# Patient Record
Sex: MALE | Race: WHITE | Employment: OTHER | URBAN - METROPOLITAN AREA
[De-identification: names, ages, dates, MRNs, and addresses within clinical notes are randomized per-mention and may not be internally consistent; named-entity substitution may affect disease eponyms.]

---

## 2017-01-01 ENCOUNTER — HOSPITAL ENCOUNTER (OUTPATIENT)
Dept: OTHER | Age: 82
Discharge: HOME | End: 2017-01-31
Attending: INTERNAL MEDICINE | Admitting: INTERNAL MEDICINE

## 2017-01-17 LAB
ANION GAP SERPL CALCULATED.3IONS-SCNC: 14 MMOL/L (ref 4–16)
BUN BLDV-MCNC: 31 MG/DL (ref 6–23)
CALCIUM SERPL-MCNC: 9.1 MG/DL (ref 8.3–10.6)
CHLORIDE BLD-SCNC: 99 MMOL/L (ref 99–110)
CO2: 25 MMOL/L (ref 21–32)
CREAT SERPL-MCNC: 1.5 MG/DL (ref 0.9–1.3)
GFR AFRICAN AMERICAN: 53 ML/MIN/1.73M2
GFR NON-AFRICAN AMERICAN: 44 ML/MIN/1.73M2
GLUCOSE BLD-MCNC: 72 MG/DL (ref 70–140)
POTASSIUM SERPL-SCNC: 4.6 MMOL/L (ref 3.5–5.1)
SODIUM BLD-SCNC: 138 MMOL/L (ref 135–145)

## 2017-02-01 ENCOUNTER — HOSPITAL ENCOUNTER (OUTPATIENT)
Dept: OTHER | Age: 82
Discharge: HOME | End: 2017-02-28
Attending: INTERNAL MEDICINE | Admitting: INTERNAL MEDICINE

## 2017-02-16 LAB
ANION GAP SERPL CALCULATED.3IONS-SCNC: 11 MMOL/L (ref 4–16)
BUN BLDV-MCNC: 34 MG/DL (ref 6–23)
CALCIUM SERPL-MCNC: 8.8 MG/DL (ref 8.3–10.6)
CHLORIDE BLD-SCNC: 99 MMOL/L (ref 99–110)
CO2: 27 MMOL/L (ref 21–32)
CREAT SERPL-MCNC: 1.4 MG/DL (ref 0.9–1.3)
GFR AFRICAN AMERICAN: 58 ML/MIN/1.73M2
GFR NON-AFRICAN AMERICAN: 48 ML/MIN/1.73M2
GLUCOSE BLD-MCNC: 71 MG/DL (ref 70–140)
POTASSIUM SERPL-SCNC: 4.8 MMOL/L (ref 3.5–5.1)
SODIUM BLD-SCNC: 137 MMOL/L (ref 135–145)
URIC ACID: 7.5 MG/DL (ref 3.5–7.2)

## 2017-02-22 LAB
OVA AND PARASITE WET MOUNT: NEGATIVE
TRICHROME SMEAR, STOOL O&P: NORMAL

## 2017-03-01 ENCOUNTER — HOSPITAL ENCOUNTER (OUTPATIENT)
Dept: OTHER | Age: 82
Discharge: HOME | End: 2017-03-31
Attending: INTERNAL MEDICINE | Admitting: INTERNAL MEDICINE

## 2017-03-16 LAB
ANION GAP SERPL CALCULATED.3IONS-SCNC: 13 MMOL/L (ref 4–16)
BUN BLDV-MCNC: 30 MG/DL (ref 6–23)
CALCIUM SERPL-MCNC: 8.9 MG/DL (ref 8.3–10.6)
CHLORIDE BLD-SCNC: 100 MMOL/L (ref 99–110)
CO2: 25 MMOL/L (ref 21–32)
CREAT SERPL-MCNC: 1.4 MG/DL (ref 0.9–1.3)
GFR AFRICAN AMERICAN: 58 ML/MIN/1.73M2
GFR NON-AFRICAN AMERICAN: 48 ML/MIN/1.73M2
GLUCOSE BLD-MCNC: 75 MG/DL (ref 70–140)
POTASSIUM SERPL-SCNC: 4.7 MMOL/L (ref 3.5–5.1)
SODIUM BLD-SCNC: 138 MMOL/L (ref 135–145)

## 2017-03-23 LAB
HCT VFR BLD CALC: 40.5 % (ref 42–52)
HEMOGLOBIN: 12.7 GM/DL (ref 13.5–18)
MCH RBC QN AUTO: 27.9 PG (ref 27–31)
MCHC RBC AUTO-ENTMCNC: 31.4 % (ref 32–36)
MCV RBC AUTO: 89 FL (ref 78–100)
PDW BLD-RTO: 15.2 % (ref 11.7–14.9)
PLATELET # BLD: 168 K/CU MM (ref 140–440)
PMV BLD AUTO: 14.1 FL (ref 7.5–11.1)
RBC # BLD: 4.55 M/CU MM (ref 4.6–6.2)
WBC # BLD: 6.9 K/CU MM (ref 4–10.5)

## 2017-03-24 LAB
CULTURE: NORMAL
REPORT STATUS: NORMAL
REQUEST PROBLEM: NORMAL
SPECIMEN: NORMAL

## 2017-04-01 ENCOUNTER — HOSPITAL ENCOUNTER (OUTPATIENT)
Dept: OTHER | Age: 82
Discharge: HOME | End: 2017-04-30
Attending: INTERNAL MEDICINE | Admitting: INTERNAL MEDICINE

## 2017-04-14 LAB — HEMOCCULT SP1 STL QL: NEGATIVE

## 2017-04-17 LAB
OVA AND PARASITE WET MOUNT: NEGATIVE
TRICHROME SMEAR, STOOL O&P: NEGATIVE

## 2017-04-18 LAB
ANION GAP SERPL CALCULATED.3IONS-SCNC: 15 MMOL/L (ref 4–16)
BUN BLDV-MCNC: 41 MG/DL (ref 6–23)
CALCIUM SERPL-MCNC: 8.8 MG/DL (ref 8.3–10.6)
CHLORIDE BLD-SCNC: 101 MMOL/L (ref 99–110)
CO2: 26 MMOL/L (ref 21–32)
CREAT SERPL-MCNC: 1.6 MG/DL (ref 0.9–1.3)
GFR AFRICAN AMERICAN: 50 ML/MIN/1.73M2
GFR NON-AFRICAN AMERICAN: 41 ML/MIN/1.73M2
GLUCOSE BLD-MCNC: 64 MG/DL (ref 70–140)
POTASSIUM SERPL-SCNC: 4.8 MMOL/L (ref 3.5–5.1)
SODIUM BLD-SCNC: 142 MMOL/L (ref 135–145)

## 2017-05-01 ENCOUNTER — HOSPITAL ENCOUNTER (OUTPATIENT)
Dept: OTHER | Age: 82
Discharge: HOME | End: 2017-05-31
Attending: INTERNAL MEDICINE | Admitting: INTERNAL MEDICINE

## 2017-05-16 LAB
ANION GAP SERPL CALCULATED.3IONS-SCNC: 14 MMOL/L (ref 4–16)
BUN BLDV-MCNC: 27 MG/DL (ref 6–23)
CALCIUM SERPL-MCNC: 9 MG/DL (ref 8.3–10.6)
CHLORIDE BLD-SCNC: 100 MMOL/L (ref 99–110)
CO2: 24 MMOL/L (ref 21–32)
CREAT SERPL-MCNC: 1.4 MG/DL (ref 0.9–1.3)
GFR AFRICAN AMERICAN: 58 ML/MIN/1.73M2
GFR NON-AFRICAN AMERICAN: 48 ML/MIN/1.73M2
GLUCOSE BLD-MCNC: 67 MG/DL (ref 70–140)
HCT VFR BLD CALC: 39.9 % (ref 42–52)
HEMOGLOBIN: 12.2 GM/DL (ref 13.5–18)
MCH RBC QN AUTO: 27.2 PG (ref 27–31)
MCHC RBC AUTO-ENTMCNC: 30.6 % (ref 32–36)
MCV RBC AUTO: 89.1 FL (ref 78–100)
PDW BLD-RTO: 15.1 % (ref 11.7–14.9)
PLATELET # BLD: 161 K/CU MM (ref 140–440)
PMV BLD AUTO: 13.9 FL (ref 7.5–11.1)
POTASSIUM SERPL-SCNC: 4.2 MMOL/L (ref 3.5–5.1)
RBC # BLD: 4.48 M/CU MM (ref 4.6–6.2)
SODIUM BLD-SCNC: 138 MMOL/L (ref 135–145)
WBC # BLD: 6.1 K/CU MM (ref 4–10.5)

## 2017-06-01 ENCOUNTER — HOSPITAL ENCOUNTER (OUTPATIENT)
Dept: OTHER | Age: 82
Discharge: HOME | End: 2017-06-30
Attending: INTERNAL MEDICINE | Admitting: INTERNAL MEDICINE

## 2017-06-07 LAB
ALBUMIN SERPL-MCNC: 2.9 GM/DL (ref 3.4–5)
ALP BLD-CCNC: 55 IU/L (ref 40–128)
ALT SERPL-CCNC: 7 U/L (ref 10–40)
ANION GAP SERPL CALCULATED.3IONS-SCNC: 12 MMOL/L (ref 4–16)
AST SERPL-CCNC: 13 IU/L (ref 15–37)
BILIRUB SERPL-MCNC: 0.3 MG/DL (ref 0–1)
BUN BLDV-MCNC: 31 MG/DL (ref 6–23)
CALCIUM SERPL-MCNC: 8.4 MG/DL (ref 8.3–10.6)
CHLORIDE BLD-SCNC: 102 MMOL/L (ref 99–110)
CO2: 26 MMOL/L (ref 21–32)
CREAT SERPL-MCNC: 1.5 MG/DL (ref 0.9–1.3)
GFR AFRICAN AMERICAN: 53 ML/MIN/1.73M2
GFR NON-AFRICAN AMERICAN: 44 ML/MIN/1.73M2
GLUCOSE BLD-MCNC: 75 MG/DL (ref 70–140)
HCT VFR BLD CALC: 36 % (ref 42–52)
HEMOGLOBIN: 11.4 GM/DL (ref 13.5–18)
MCH RBC QN AUTO: 27.9 PG (ref 27–31)
MCHC RBC AUTO-ENTMCNC: 31.7 % (ref 32–36)
MCV RBC AUTO: 88.2 FL (ref 78–100)
PDW BLD-RTO: 15.7 % (ref 11.7–14.9)
PLATELET # BLD: 166 K/CU MM (ref 140–440)
PMV BLD AUTO: 13.5 FL (ref 7.5–11.1)
POTASSIUM SERPL-SCNC: 4.3 MMOL/L (ref 3.5–5.1)
RBC # BLD: 4.08 M/CU MM (ref 4.6–6.2)
SODIUM BLD-SCNC: 140 MMOL/L (ref 135–145)
TOTAL PROTEIN: 5.7 GM/DL (ref 6.4–8.2)
WBC # BLD: 8.1 K/CU MM (ref 4–10.5)

## 2017-06-08 ENCOUNTER — HOSPITAL ENCOUNTER (OUTPATIENT)
Dept: GENERAL RADIOLOGY | Age: 82
Discharge: HOME | End: 2017-06-08
Attending: INTERNAL MEDICINE | Admitting: INTERNAL MEDICINE

## 2017-06-08 DIAGNOSIS — R13.14 DYSPHAGIA, PHARYNGOESOPHAGEAL: ICD-10-CM

## 2017-07-01 ENCOUNTER — HOSPITAL ENCOUNTER (OUTPATIENT)
Dept: OTHER | Age: 82
Discharge: HOME | End: 2017-07-31
Attending: INTERNAL MEDICINE | Admitting: INTERNAL MEDICINE

## 2017-07-10 LAB
ALBUMIN SERPL-MCNC: 2.9 GM/DL (ref 3.4–5)
ALP BLD-CCNC: 67 IU/L (ref 40–128)
ALT SERPL-CCNC: 9 U/L (ref 10–40)
ANION GAP SERPL CALCULATED.3IONS-SCNC: 11 MMOL/L (ref 4–16)
AST SERPL-CCNC: 13 IU/L (ref 15–37)
BASOPHILS ABSOLUTE: 0 K/CU MM
BASOPHILS RELATIVE PERCENT: 0.5 % (ref 0–1)
BILIRUB SERPL-MCNC: 0.3 MG/DL (ref 0–1)
BUN BLDV-MCNC: 47 MG/DL (ref 6–23)
CALCIUM SERPL-MCNC: 8.7 MG/DL (ref 8.3–10.6)
CHLORIDE BLD-SCNC: 99 MMOL/L (ref 99–110)
CO2: 28 MMOL/L (ref 21–32)
CREAT SERPL-MCNC: 1.5 MG/DL (ref 0.9–1.3)
DIFFERENTIAL TYPE: ABNORMAL
EOSINOPHILS ABSOLUTE: 0.2 K/CU MM
EOSINOPHILS RELATIVE PERCENT: 2.9 % (ref 0–3)
GFR AFRICAN AMERICAN: 53 ML/MIN/1.73M2
GFR NON-AFRICAN AMERICAN: 44 ML/MIN/1.73M2
GLUCOSE BLD-MCNC: 74 MG/DL (ref 70–140)
HCT VFR BLD CALC: 36.4 % (ref 42–52)
HEMOGLOBIN: 11.3 GM/DL (ref 13.5–18)
IMMATURE NEUTROPHIL %: 0.3 % (ref 0–0.43)
LYMPHOCYTES ABSOLUTE: 1.6 K/CU MM
LYMPHOCYTES RELATIVE PERCENT: 24.1 % (ref 24–44)
MCH RBC QN AUTO: 27.7 PG (ref 27–31)
MCHC RBC AUTO-ENTMCNC: 31 % (ref 32–36)
MCV RBC AUTO: 89.2 FL (ref 78–100)
MONOCYTES ABSOLUTE: 0.7 K/CU MM
MONOCYTES RELATIVE PERCENT: 10.6 % (ref 0–4)
NUCLEATED RBC %: 0 %
PDW BLD-RTO: 16.6 % (ref 11.7–14.9)
PLATELET # BLD: 164 K/CU MM (ref 140–440)
PMV BLD AUTO: 13.7 FL (ref 7.5–11.1)
POTASSIUM SERPL-SCNC: 4.6 MMOL/L (ref 3.5–5.1)
RBC # BLD: 4.08 M/CU MM (ref 4.6–6.2)
SEGMENTED NEUTROPHILS ABSOLUTE COUNT: 4.1 K/CU MM
SEGMENTED NEUTROPHILS RELATIVE PERCENT: 61.6 % (ref 36–66)
SODIUM BLD-SCNC: 138 MMOL/L (ref 135–145)
TOTAL IMMATURE NEUTOROPHIL: 0.02 K/CU MM
TOTAL NUCLEATED RBC: 0 K/CU MM
TOTAL PROTEIN: 5.7 GM/DL (ref 6.4–8.2)
TSH HIGH SENSITIVITY: 1.12 UIU/ML (ref 0.27–4.2)
WBC # BLD: 6.6 K/CU MM (ref 4–10.5)

## 2017-07-25 LAB
ANION GAP SERPL CALCULATED.3IONS-SCNC: 13 MMOL/L (ref 4–16)
BUN BLDV-MCNC: 48 MG/DL (ref 6–23)
CALCIUM SERPL-MCNC: 9.1 MG/DL (ref 8.3–10.6)
CHLORIDE BLD-SCNC: 104 MMOL/L (ref 99–110)
CO2: 26 MMOL/L (ref 21–32)
CREAT SERPL-MCNC: 1.6 MG/DL (ref 0.9–1.3)
GFR AFRICAN AMERICAN: 50 ML/MIN/1.73M2
GFR NON-AFRICAN AMERICAN: 41 ML/MIN/1.73M2
GLUCOSE BLD-MCNC: 68 MG/DL (ref 70–140)
POTASSIUM SERPL-SCNC: 4.9 MMOL/L (ref 3.5–5.1)
PREALBUMIN: 22 MG/DL (ref 20–40)
SODIUM BLD-SCNC: 143 MMOL/L (ref 135–145)

## 2017-08-01 ENCOUNTER — HOSPITAL ENCOUNTER (OUTPATIENT)
Dept: OTHER | Age: 82
Discharge: HOME | End: 2017-08-31
Attending: INTERNAL MEDICINE | Admitting: INTERNAL MEDICINE

## 2017-08-09 LAB
ALBUMIN SERPL-MCNC: 3 GM/DL (ref 3.4–5)
ALP BLD-CCNC: 68 IU/L (ref 40–128)
ALT SERPL-CCNC: 9 U/L (ref 10–40)
ANION GAP SERPL CALCULATED.3IONS-SCNC: 10 MMOL/L (ref 4–16)
AST SERPL-CCNC: 14 IU/L (ref 15–37)
BASOPHILS ABSOLUTE: 0 K/CU MM
BASOPHILS RELATIVE PERCENT: 0.5 % (ref 0–1)
BILIRUB SERPL-MCNC: 0.2 MG/DL (ref 0–1)
BUN BLDV-MCNC: 49 MG/DL (ref 6–23)
CALCIUM SERPL-MCNC: 8.7 MG/DL (ref 8.3–10.6)
CHLORIDE BLD-SCNC: 101 MMOL/L (ref 99–110)
CO2: 28 MMOL/L (ref 21–32)
CREAT SERPL-MCNC: 1.6 MG/DL (ref 0.9–1.3)
DIFFERENTIAL TYPE: ABNORMAL
EOSINOPHILS ABSOLUTE: 0.2 K/CU MM
EOSINOPHILS RELATIVE PERCENT: 3.3 % (ref 0–3)
GFR AFRICAN AMERICAN: 49 ML/MIN/1.73M2
GFR NON-AFRICAN AMERICAN: 41 ML/MIN/1.73M2
GLUCOSE BLD-MCNC: 73 MG/DL (ref 70–140)
HCT VFR BLD CALC: 35.9 % (ref 42–52)
HEMOGLOBIN: 11.2 GM/DL (ref 13.5–18)
IMMATURE NEUTROPHIL %: 0.2 % (ref 0–0.43)
LYMPHOCYTES ABSOLUTE: 1.8 K/CU MM
LYMPHOCYTES RELATIVE PERCENT: 30.4 % (ref 24–44)
MCH RBC QN AUTO: 28.8 PG (ref 27–31)
MCHC RBC AUTO-ENTMCNC: 31.2 % (ref 32–36)
MCV RBC AUTO: 92.3 FL (ref 78–100)
MONOCYTES ABSOLUTE: 0.6 K/CU MM
MONOCYTES RELATIVE PERCENT: 9.9 % (ref 0–4)
NUCLEATED RBC %: 0 %
PDW BLD-RTO: 16.1 % (ref 11.7–14.9)
PLATELET # BLD: 136 K/CU MM (ref 140–440)
PMV BLD AUTO: 14.5 FL (ref 7.5–11.1)
POTASSIUM SERPL-SCNC: 5 MMOL/L (ref 3.5–5.1)
RBC # BLD: 3.89 M/CU MM (ref 4.6–6.2)
SEGMENTED NEUTROPHILS ABSOLUTE COUNT: 3.2 K/CU MM
SEGMENTED NEUTROPHILS RELATIVE PERCENT: 55.7 % (ref 36–66)
SODIUM BLD-SCNC: 139 MMOL/L (ref 135–145)
TOTAL IMMATURE NEUTOROPHIL: 0.01 K/CU MM
TOTAL NUCLEATED RBC: 0 K/CU MM
TOTAL PROTEIN: 5.6 GM/DL (ref 6.4–8.2)
WBC # BLD: 5.8 K/CU MM (ref 4–10.5)

## 2017-09-01 ENCOUNTER — HOSPITAL ENCOUNTER (OUTPATIENT)
Dept: OTHER | Age: 82
Discharge: HOME | End: 2017-09-30
Attending: INTERNAL MEDICINE | Admitting: INTERNAL MEDICINE

## 2017-10-19 ENCOUNTER — TELEPHONE (OUTPATIENT)
Dept: SURGERY | Age: 82
End: 2017-10-19

## 2017-10-19 NOTE — TELEPHONE ENCOUNTER
Spoke with Dr. Candice Rachel in regard to [facility] gastrostomy tube pulled out. Per Dr. Candice Rachel. Patient referred to Emergency Room for eval and re-insertion of tube.      Left message for for Bernie @ [facility] [phone]

## 2017-10-25 ENCOUNTER — TELEPHONE (OUTPATIENT)
Dept: SURGERY | Age: 82
End: 2017-10-25

## 2017-10-25 NOTE — TELEPHONE ENCOUNTER
Called and spoke with Bernie about Kell and his g-tube. Bernie had called earlier in the week asking about what to do for kell because his g-tube had been pulled out. Per telephone lea Tovarandrew told her to :  Spoke with Dr. Shannan Torres in regard to [facility] tube pulled out. Per Dr. Shannan Torres. Patient referred to Emergency Room for eval and re-insertion of tube.      Left message for for Bernie @ [facility]  called back and spoke with anjel stating she placed a brown and wanted to know if that would be sufficient per pau perfect serve message    Spoke with Bernie today - per Dr Shannan Torres, Brown needs to be taped to the skin and PJ will schedule O/P surg top place new g-tube.  Will Call Bernie after scheduled

## 2017-11-03 VITALS — WEIGHT: 148 LBS | BODY MASS INDEX: 23.23 KG/M2 | HEIGHT: 67 IN

## 2017-11-06 NOTE — PROGRESS NOTES
Called an talked with patients caregiver at [facility] and notified her surgery time changed to [address] with arrival at 46- verbalized understanding

## 2017-11-07 ENCOUNTER — HOSPITAL ENCOUNTER (OUTPATIENT)
Dept: SURGERY | Age: 82
Discharge: HOME | End: 2017-12-06
Attending: SURGERY | Admitting: SURGERY

## 2017-11-16 ENCOUNTER — OFFICE VISIT (OUTPATIENT)
Dept: SURGERY | Age: 82
End: 2017-11-16

## 2017-11-16 VITALS
SYSTOLIC BLOOD PRESSURE: 101 MMHG | HEART RATE: 66 BPM | WEIGHT: 146 LBS | BODY MASS INDEX: 22.91 KG/M2 | HEIGHT: 67 IN | DIASTOLIC BLOOD PRESSURE: 58 MMHG

## 2017-11-16 DIAGNOSIS — Z93.1 GASTROINTESTINAL TUBE IN SITU (HCC): Primary | ICD-10-CM

## 2017-11-16 PROCEDURE — 4040F PNEUMOC VAC/ADMIN/RCVD: CPT | Performed by: SURGERY

## 2017-11-16 PROCEDURE — 1036F TOBACCO NON-USER: CPT | Performed by: SURGERY

## 2017-11-16 PROCEDURE — G8428 CUR MEDS NOT DOCUMENT: HCPCS | Performed by: SURGERY

## 2017-11-16 PROCEDURE — G8484 FLU IMMUNIZE NO ADMIN: HCPCS | Performed by: SURGERY

## 2017-11-16 PROCEDURE — 99212 OFFICE O/P EST SF 10 MIN: CPT | Performed by: SURGERY

## 2017-11-16 PROCEDURE — G8420 CALC BMI NORM PARAMETERS: HCPCS | Performed by: SURGERY

## 2017-11-16 PROCEDURE — 1123F ACP DISCUSS/DSCN MKR DOCD: CPT | Performed by: SURGERY

## 2018-01-18 ENCOUNTER — OFFICE VISIT (OUTPATIENT)
Dept: SURGERY | Age: 83
End: 2018-01-18

## 2018-01-18 VITALS
SYSTOLIC BLOOD PRESSURE: 107 MMHG | BODY MASS INDEX: 22.28 KG/M2 | HEIGHT: 68 IN | DIASTOLIC BLOOD PRESSURE: 68 MMHG | WEIGHT: 147 LBS | HEART RATE: 65 BPM

## 2018-01-18 DIAGNOSIS — Z93.1 GASTROINTESTINAL TUBE IN SITU (HCC): Primary | ICD-10-CM

## 2018-01-18 PROCEDURE — 99213 OFFICE O/P EST LOW 20 MIN: CPT | Performed by: SURGERY

## 2018-01-28 ASSESSMENT — ENCOUNTER SYMPTOMS
EYE ITCHING: 0
BACK PAIN: 0
PHOTOPHOBIA: 0
APNEA: 0
ANAL BLEEDING: 0
SORE THROAT: 0
STRIDOR: 0
CONSTIPATION: 0
RECTAL PAIN: 0
EYE REDNESS: 0
COLOR CHANGE: 0
CHOKING: 0

## 2018-01-28 NOTE — PROGRESS NOTES
Chief Complaint   Patient presents with    Abdominal Pain         SUBJECTIVE:  HPI: Patient is here with complaints of pain around the G-tube. This is due to chronic moisture and gastric fluid irritation. He has neem applying bonifacio oxide and has been helping. .    Thoroughly reviewed the patient's medical history, family history, social history and review of systems with the patient today in the office. Please see medical record for pertinent positives.       Past Medical History:   Diagnosis Date    Acquired diverticulum of esophagus     Alzheimer's disease     Anxiety     Arthritis     Asthma     Atrial fibrillation (HCC)     Chronic gout     Chronic ischemic heart disease     Chronic kidney disease     CKD (chronic kidney disease)     per caregiver hx of CKD stage 3    COPD (chronic obstructive pulmonary disease) (HCC)     Dementia     Dysphagia     npo - no oral intake- takes all meds through g tube and takes 4 cans of Two [place] per day\"    Enlarged prostate with urinary obstruction     GERD (gastroesophageal reflux disease)     Glaucoma     History of blood transfusion     History of BPH     [place] (hard of hearing)     Hyperlipidemia     Hypertension     Muscle weakness (generalized)     Other symbolic dysfunction     Pancytopenia (HCC)     Schizophrenia ([facility] [address]     Sinus bradycardia     per old chart pt had this dx with ER visit 10/2015 along with RBBB      Past Surgical History:   Procedure Laterality Date    ENDOSCOPY, COLON, DIAGNOSTIC      per old chart pt had egd with dilatation done 6/2014    ESOPHAGEAL DILATATION  11/2015    also in 2014, has had 3 total    GASTROSTOMY TUBE PLACEMENT  2005    GASTROSTOMY TUBE PLACEMENT      peg tube repositioned    GASTROSTOMY TUBE PLACEMENT  01/24/2017    Open G tube    TONSILLECTOMY       Current Outpatient Prescriptions   Medication Sig Dispense Refill    loperamide (IMODIUM) 2 MG capsule 2 mg by Per G Tube route 4 times daily as needed for Diarrhea      QUEtiapine (SEROQUEL) 50 MG tablet 50 mg nightly as needed Per g tube      scopolamine (TRANSDERM-SCOP) transdermal patch Place 1 patch onto the skin every 72 hours      tuberculin 5 UNIT/0.1ML injection Inject 5 Units into the skin Every 12 months starting on the 15 th      donepezil (ARICEPT) 10 MG tablet 10 mg by Per G Tube route nightly      doxycycline hyclate (VIBRAMYCIN) 100 MG capsule 100 mg by Per G Tube route 2 times daily      albuterol (PROVENTIL) (2.5 MG/3ML) 0.083% nebulizer solution Take 2.5 mg by nebulization every 4 hours as needed for Wheezing      HYDROcodone-acetaminophen (NORCO) 5-325 MG per tablet Take 1 tablet by mouth every 6 hours as needed for Pain .  nitroGLYCERIN (NITRODUR) 0.1 MG/HR Place 1 patch onto the skin daily 0.2 mg every 3 days      ondansetron (ZOFRAN ODT) 4 MG disintegrating tablet Take 1 tablet by mouth every 8 hours as needed for Nausea 15 tablet 0    melatonin 3 MG TABS tablet 3 mg by Per G Tube route nightly as needed 2 tablets per g tube nightly      polyethylene glycol (MIRALAX) PACK packet 17 g by Per G Tube route daily      eszopiclone (LUNESTA) 2 MG TABS 2 mg by Per G Tube route nightly as needed      guaiFENesin (MUCINEX) 600 MG extended release tablet Take 1,200 mg by mouth 2 times daily      docusate sodium (COLACE) 100 MG capsule Take 1 capsule by mouth daily as needed for Constipation (while taking pain medicine) 30 capsule 3    ferrous sulfate (DEISI-IN-SOL) 75 (15 FE) MG/ML solution Take 1 mL by mouth daily 30 mL 1    docusate sodium (COLACE) 100 MG capsule Take 1 capsule by mouth 2 times daily as needed for Constipation (Patient taking differently: 100 mg by Per G Tube route 2 times daily as needed for Constipation ) 30 capsule 0    aspirin 81 MG tablet Take 81 mg by mouth daily      bacitracin zinc 500 UNIT/GM ointment Apply topically 2 times daily Apply topically 2 times daily.       zinc oxide 40 % PSTE paste Apply to g-tube photophobia, redness and itching. Respiratory: Negative for apnea, choking and stridor. Cardiovascular: Negative for chest pain and palpitations. Gastrointestinal: Negative for anal bleeding, constipation and rectal pain. Endocrine: Negative for polydipsia. Genitourinary: Negative for enuresis, flank pain and hematuria. Musculoskeletal: Negative for back pain, joint swelling and myalgias. Skin: Negative for color change and pallor. Allergic/Immunologic: Negative for environmental allergies. Neurological: Negative for syncope and speech difficulty. Psychiatric/Behavioral: Negative for confusion and hallucinations. OBJECTIVE:  Physical Exam:    /68   Pulse 65   Ht 5' 7.72\" (1.72 m)   Wt 147 lb (66.7 kg)   BMI 22.54 kg/m²      Physical Exam   Constitutional: He is oriented to person, place, and time. He appears well-developed and well-nourished. HENT:   Head: Normocephalic. Eyes: Pupils are equal, round, and reactive to light. Neck: Normal range of motion. Neck supple. Cardiovascular: Normal rate. Pulmonary/Chest: Effort normal.   Abdominal: Soft. He exhibits no distension and no mass. There is tenderness. There is no rebound and no guarding.   g-tube site irritation   Musculoskeletal: Normal range of motion. Neurological: He is alert and oriented to person, place, and time. Skin: Skin is warm. Psychiatric: He has a normal mood and affect. ASSESSMENT:  1. Gastrointestinal tube in situ [facility])          PLAN:  Treatment:  Cont local wound care. Also advised to use duoderm as barrier. .  Patient counseled on risks, benefits, and alternatives of treatment plan at length today. Patient states an understanding and willingness to proceed with plan. No orders of the defined types were placed in this encounter. No orders of the defined types were placed in this encounter.        Follow Up:  Return in about 10 days (around 1/28/2018), or if symptoms worsen or fail to improve.       Tai Ritchie MD

## 2018-08-09 PROBLEM — G93.40 ENCEPHALOPATHY: Status: ACTIVE | Noted: 2018-08-09

## 2018-08-09 PROBLEM — R77.8 ELEVATED TROPONIN: Status: ACTIVE | Noted: 2018-08-09

## 2018-08-17 PROBLEM — S72.001A HIP FRACTURE REQUIRING OPERATIVE REPAIR, RIGHT, CLOSED, INITIAL ENCOUNTER (HCC): Status: ACTIVE | Noted: 2018-08-17

## 2018-08-30 ENCOUNTER — OFFICE VISIT (OUTPATIENT)
Dept: ORTHOPEDIC SURGERY | Age: 83
End: 2018-08-30

## 2018-08-30 VITALS — HEIGHT: 70 IN | WEIGHT: 226 LBS | BODY MASS INDEX: 32.35 KG/M2 | RESPIRATION RATE: 16 BRPM

## 2018-08-30 DIAGNOSIS — R52 PAIN: ICD-10-CM

## 2018-08-30 DIAGNOSIS — S72.001A HIP FRACTURE REQUIRING OPERATIVE REPAIR, RIGHT, CLOSED, INITIAL ENCOUNTER (HCC): ICD-10-CM

## 2018-08-30 DIAGNOSIS — Z96.649 S/P HIP HEMIARTHROPLASTY: Primary | ICD-10-CM

## 2018-08-30 PROBLEM — H40.9 GLAUCOMA: Status: ACTIVE | Noted: 2018-08-30

## 2018-08-30 PROBLEM — E78.5 HYPERLIPIDEMIA: Status: ACTIVE | Noted: 2018-08-30

## 2018-08-30 PROBLEM — I10 HYPERTENSION: Status: ACTIVE | Noted: 2018-08-30

## 2018-08-30 PROBLEM — R06.09 DOE (DYSPNEA ON EXERTION): Status: ACTIVE | Noted: 2018-08-30

## 2018-08-30 PROBLEM — I48.91 ATRIAL FIBRILLATION (HCC): Status: ACTIVE | Noted: 2018-08-30

## 2018-08-30 PROBLEM — I25.9 CHRONIC ISCHEMIC HEART DISEASE: Status: ACTIVE | Noted: 2018-08-30

## 2018-08-30 PROBLEM — K21.9 GERD (GASTROESOPHAGEAL REFLUX DISEASE): Status: ACTIVE | Noted: 2018-08-30

## 2018-08-30 PROBLEM — M19.90 ARTHRITIS: Status: ACTIVE | Noted: 2018-08-30

## 2018-08-30 PROBLEM — H91.90 HOH (HARD OF HEARING): Status: ACTIVE | Noted: 2018-08-30

## 2018-08-30 PROCEDURE — 99024 POSTOP FOLLOW-UP VISIT: CPT | Performed by: ORTHOPAEDIC SURGERY

## 2018-08-30 RX ORDER — ISOSORBIDE DINITRATE 20 MG/1
20 TABLET ORAL
COMMUNITY
Start: 2014-04-08 | End: 2018-09-25

## 2018-08-30 RX ORDER — SENNOSIDES 8.6 MG
8.6 TABLET ORAL
COMMUNITY
End: 2018-09-25

## 2018-08-30 RX ORDER — CARVEDILOL 3.12 MG/1
3.12 TABLET ORAL
COMMUNITY
End: 2018-09-25

## 2018-08-30 RX ORDER — ZOLPIDEM TARTRATE 10 MG/1
1 TABLET ORAL
COMMUNITY
End: 2018-09-25

## 2018-08-30 RX ORDER — LEVALBUTEROL TARTRATE 45 UG/1
1 AEROSOL, METERED ORAL
COMMUNITY
Start: 2014-04-08 | End: 2018-09-25

## 2018-08-30 RX ORDER — TERAZOSIN 2 MG/1
2 CAPSULE ORAL
COMMUNITY
Start: 2014-04-08 | End: 2018-09-25

## 2018-08-30 RX ORDER — HYDROCODONE BITARTRATE AND ACETAMINOPHEN 7.5; 325 MG/1; MG/1
1 TABLET ORAL 4 TIMES DAILY
Status: ON HOLD | COMMUNITY
End: 2018-12-05 | Stop reason: HOSPADM

## 2018-08-30 RX ORDER — OLANZAPINE 5 MG/1
5 TABLET ORAL
COMMUNITY
Start: 2014-04-08 | End: 2018-09-25

## 2018-08-30 RX ORDER — LORATADINE 10 MG/1
10 TABLET ORAL
COMMUNITY
End: 2018-12-03

## 2018-08-30 RX ORDER — SULFAMETHOXAZOLE AND TRIMETHOPRIM 800; 160 MG/1; MG/1
TABLET ORAL
COMMUNITY
Start: 2016-04-01 | End: 2018-09-25

## 2018-08-30 RX ORDER — ROSUVASTATIN CALCIUM 40 MG/1
1 TABLET, COATED ORAL
COMMUNITY
End: 2018-09-25

## 2018-08-30 RX ORDER — SPIRONOLACTONE 25 MG/1
0.5 TABLET ORAL
COMMUNITY
End: 2018-09-25

## 2018-08-30 RX ORDER — LISINOPRIL 5 MG/1
5 TABLET ORAL
COMMUNITY
End: 2018-09-25

## 2018-08-30 RX ORDER — LORAZEPAM 1 MG/1
1 TABLET ORAL
COMMUNITY
Start: 2014-04-08 | End: 2018-09-25

## 2018-08-30 RX ORDER — AMLODIPINE BESYLATE 5 MG/1
1 TABLET ORAL
COMMUNITY
End: 2018-09-25

## 2018-08-30 RX ORDER — LANSOPRAZOLE 30 MG/1
1 CAPSULE, DELAYED RELEASE ORAL
COMMUNITY
Start: 2012-04-26 | End: 2018-09-25

## 2018-08-30 RX ORDER — TRAVOPROST OPHTHALMIC SOLUTION 0.04 MG/ML
SOLUTION OPHTHALMIC
COMMUNITY
End: 2018-09-25

## 2018-08-30 NOTE — PROGRESS NOTES
Date of surgery:   August 18th 2018    History:  Mr. Renetta Mccallum is here in follow up regarding his right   Right hip hemiarthroplasty    He is doing rather well. He is having 4/10 pain. He denies chest pain, SOB, calf pain,fever,wound drainage. No other issues. Physical: He demonstrates appropriate mood and affect. rightLeg exam:  The incision is clean, dry, intact and nontender with no erythema. He has mild edema, the Leg compartments are soft . There are No cords or calf tenderness. No significant calf/ankle edema. He is neurovascularly intact distally. ROM of the hip has trace pain.     taken and reviewed  1 view of the right hip and 1 view pelvis show Right hip hemiarthroplasty in place in good position      Impression: Status post above, doing well       Plan:   -staples removed, steri strips placed   -keep new dressing on clean and dry until Monday, after Monday 3 September, no further dressing needed   -full weight bearing as tolerated with walker   -fall precautions   -follow up in 4 weeks with x-rays     SHOW THIS [id number]

## 2018-09-08 PROBLEM — R77.8 ELEVATED TROPONIN: Status: RESOLVED | Noted: 2018-08-09 | Resolved: 2018-09-08

## 2018-12-03 ENCOUNTER — APPOINTMENT (OUTPATIENT)
Dept: CT IMAGING | Age: 83
DRG: 683 | End: 2018-12-03
Payer: MEDICARE

## 2018-12-03 ENCOUNTER — HOSPITAL ENCOUNTER (OUTPATIENT)
Age: 83
Setting detail: OBSERVATION
Discharge: NURSING, REHAB OR LONG-TERM CARE | DRG: 683 | End: 2018-12-05
Attending: EMERGENCY MEDICINE | Admitting: HOSPITALIST
Payer: MEDICARE

## 2018-12-03 DIAGNOSIS — N17.9 AKI (ACUTE KIDNEY INJURY) (HCC): Primary | ICD-10-CM

## 2018-12-03 DIAGNOSIS — R31.9 URINARY TRACT INFECTION WITH HEMATURIA, SITE UNSPECIFIED: ICD-10-CM

## 2018-12-03 DIAGNOSIS — K80.20 CALCULUS OF GALLBLADDER WITHOUT CHOLECYSTITIS WITHOUT OBSTRUCTION: ICD-10-CM

## 2018-12-03 DIAGNOSIS — N39.0 URINARY TRACT INFECTION WITH HEMATURIA, SITE UNSPECIFIED: ICD-10-CM

## 2018-12-03 LAB
ALBUMIN SERPL-MCNC: 3.2 GM/DL (ref 3.4–5)
ALP BLD-CCNC: 84 IU/L (ref 40–129)
ALT SERPL-CCNC: 8 U/L (ref 10–40)
ANION GAP SERPL CALCULATED.3IONS-SCNC: 12 MMOL/L (ref 4–16)
AST SERPL-CCNC: 16 IU/L (ref 15–37)
BACTERIA: ABNORMAL /HPF
BASOPHILS ABSOLUTE: 0.1 K/CU MM
BASOPHILS RELATIVE PERCENT: 0.8 % (ref 0–1)
BILIRUB SERPL-MCNC: 0.1 MG/DL (ref 0–1)
BILIRUBIN URINE: NEGATIVE MG/DL
BLOOD, URINE: ABNORMAL
BUN BLDV-MCNC: 91 MG/DL (ref 6–23)
CALCIUM SERPL-MCNC: 8.6 MG/DL (ref 8.3–10.6)
CHLORIDE BLD-SCNC: 96 MMOL/L (ref 99–110)
CLARITY: ABNORMAL
CO2: 30 MMOL/L (ref 21–32)
COLOR: YELLOW
CREAT SERPL-MCNC: 2.5 MG/DL (ref 0.9–1.3)
DIFFERENTIAL TYPE: ABNORMAL
EOSINOPHILS ABSOLUTE: 0.4 K/CU MM
EOSINOPHILS RELATIVE PERCENT: 4.8 % (ref 0–3)
GFR AFRICAN AMERICAN: 30 ML/MIN/1.73M2
GFR NON-AFRICAN AMERICAN: 24 ML/MIN/1.73M2
GLUCOSE BLD-MCNC: 150 MG/DL (ref 70–99)
GLUCOSE, URINE: NEGATIVE MG/DL
HCT VFR BLD CALC: 38.9 % (ref 42–52)
HEMOGLOBIN: 11.7 GM/DL (ref 13.5–18)
HYALINE CASTS: 0 /LPF
IMMATURE NEUTROPHIL %: 0.4 % (ref 0–0.43)
KETONES, URINE: NEGATIVE MG/DL
LEUKOCYTE ESTERASE, URINE: ABNORMAL
LIPASE: 34 IU/L (ref 13–60)
LYMPHOCYTES ABSOLUTE: 1.6 K/CU MM
LYMPHOCYTES RELATIVE PERCENT: 21.6 % (ref 24–44)
MCH RBC QN AUTO: 27.7 PG (ref 27–31)
MCHC RBC AUTO-ENTMCNC: 30.1 % (ref 32–36)
MCV RBC AUTO: 92.2 FL (ref 78–100)
MONOCYTES ABSOLUTE: 0.8 K/CU MM
MONOCYTES RELATIVE PERCENT: 10.2 % (ref 0–4)
NITRITE URINE, QUANTITATIVE: NEGATIVE
NUCLEATED RBC %: 0 %
PDW BLD-RTO: 15.4 % (ref 11.7–14.9)
PH, URINE: 7 (ref 5–8)
PLATELET # BLD: 181 K/CU MM (ref 140–440)
PMV BLD AUTO: 13.8 FL (ref 7.5–11.1)
POTASSIUM SERPL-SCNC: 4.7 MMOL/L (ref 3.5–5.1)
PROTEIN UA: 30 MG/DL
RBC # BLD: 4.22 M/CU MM (ref 4.6–6.2)
RBC URINE: 31 /HPF (ref 0–3)
SEGMENTED NEUTROPHILS ABSOLUTE COUNT: 4.7 K/CU MM
SEGMENTED NEUTROPHILS RELATIVE PERCENT: 62.2 % (ref 36–66)
SODIUM BLD-SCNC: 138 MMOL/L (ref 135–145)
SPECIFIC GRAVITY UA: 1.01 (ref 1–1.03)
TOTAL IMMATURE NEUTOROPHIL: 0.03 K/CU MM
TOTAL NUCLEATED RBC: 0 K/CU MM
TOTAL PROTEIN: 6.6 GM/DL (ref 6.4–8.2)
TRICHOMONAS: ABNORMAL /HPF
UROBILINOGEN, URINE: NORMAL MG/DL (ref 0.2–1)
WBC # BLD: 7.5 K/CU MM (ref 4–10.5)
WBC CLUMP: ABNORMAL /HPF
WBC UA: 83 /HPF (ref 0–2)

## 2018-12-03 PROCEDURE — 85025 COMPLETE CBC W/AUTO DIFF WBC: CPT

## 2018-12-03 PROCEDURE — 74176 CT ABD & PELVIS W/O CONTRAST: CPT

## 2018-12-03 PROCEDURE — 99285 EMERGENCY DEPT VISIT HI MDM: CPT

## 2018-12-03 PROCEDURE — G0378 HOSPITAL OBSERVATION PER HR: HCPCS

## 2018-12-03 PROCEDURE — 87186 SC STD MICRODIL/AGAR DIL: CPT

## 2018-12-03 PROCEDURE — 83690 ASSAY OF LIPASE: CPT

## 2018-12-03 PROCEDURE — 81001 URINALYSIS AUTO W/SCOPE: CPT

## 2018-12-03 PROCEDURE — 96365 THER/PROPH/DIAG IV INF INIT: CPT

## 2018-12-03 PROCEDURE — 6360000002 HC RX W HCPCS: Performed by: PHYSICIAN ASSISTANT

## 2018-12-03 PROCEDURE — 6370000000 HC RX 637 (ALT 250 FOR IP): Performed by: HOSPITALIST

## 2018-12-03 PROCEDURE — 1200000000 HC SEMI PRIVATE

## 2018-12-03 PROCEDURE — 87086 URINE CULTURE/COLONY COUNT: CPT

## 2018-12-03 PROCEDURE — 87077 CULTURE AEROBIC IDENTIFY: CPT

## 2018-12-03 PROCEDURE — 80053 COMPREHEN METABOLIC PANEL: CPT

## 2018-12-03 RX ORDER — PILOCARPINE HYDROCHLORIDE 10 MG/ML
1 SOLUTION/ DROPS OPHTHALMIC 3 TIMES DAILY
Status: DISCONTINUED | OUTPATIENT
Start: 2018-12-03 | End: 2018-12-05 | Stop reason: HOSPADM

## 2018-12-03 RX ORDER — CIPROFLOXACIN 2 MG/ML
400 INJECTION, SOLUTION INTRAVENOUS ONCE
Status: COMPLETED | OUTPATIENT
Start: 2018-12-03 | End: 2018-12-03

## 2018-12-03 RX ORDER — ONDANSETRON 4 MG/1
4 TABLET, ORALLY DISINTEGRATING ORAL EVERY 8 HOURS PRN
Status: DISCONTINUED | OUTPATIENT
Start: 2018-12-03 | End: 2018-12-05 | Stop reason: HOSPADM

## 2018-12-03 RX ORDER — SCOLOPAMINE TRANSDERMAL SYSTEM 1 MG/1
1 PATCH, EXTENDED RELEASE TRANSDERMAL
Status: DISCONTINUED | OUTPATIENT
Start: 2018-12-03 | End: 2018-12-05 | Stop reason: HOSPADM

## 2018-12-03 RX ORDER — METOPROLOL TARTRATE 50 MG/1
50 TABLET, FILM COATED ORAL 2 TIMES DAILY
Status: DISCONTINUED | OUTPATIENT
Start: 2018-12-03 | End: 2018-12-05 | Stop reason: HOSPADM

## 2018-12-03 RX ORDER — HEPARIN SODIUM 5000 [USP'U]/ML
5000 INJECTION, SOLUTION INTRAVENOUS; SUBCUTANEOUS EVERY 8 HOURS SCHEDULED
Status: DISCONTINUED | OUTPATIENT
Start: 2018-12-03 | End: 2018-12-05 | Stop reason: HOSPADM

## 2018-12-03 RX ORDER — TIMOLOL MALEATE 5 MG/ML
1 SOLUTION/ DROPS OPHTHALMIC 2 TIMES DAILY
Status: DISCONTINUED | OUTPATIENT
Start: 2018-12-03 | End: 2018-12-05 | Stop reason: HOSPADM

## 2018-12-03 RX ORDER — SODIUM POLYSTYRENE SULFONATE 15 G/60ML
15 SUSPENSION ORAL; RECTAL ONCE
Status: COMPLETED | OUTPATIENT
Start: 2018-12-03 | End: 2018-12-04

## 2018-12-03 RX ORDER — HYDROCODONE BITARTRATE AND ACETAMINOPHEN 7.5; 325 MG/1; MG/1
1 TABLET ORAL 4 TIMES DAILY
Status: DISCONTINUED | OUTPATIENT
Start: 2018-12-03 | End: 2018-12-05 | Stop reason: HOSPADM

## 2018-12-03 RX ORDER — QUETIAPINE FUMARATE 25 MG/1
50 TABLET, FILM COATED ORAL 2 TIMES DAILY
Status: DISCONTINUED | OUTPATIENT
Start: 2018-12-03 | End: 2018-12-05 | Stop reason: HOSPADM

## 2018-12-03 RX ORDER — ONDANSETRON 2 MG/ML
4 INJECTION INTRAMUSCULAR; INTRAVENOUS EVERY 6 HOURS PRN
Status: DISCONTINUED | OUTPATIENT
Start: 2018-12-03 | End: 2018-12-05 | Stop reason: HOSPADM

## 2018-12-03 RX ORDER — FUROSEMIDE 40 MG/1
40 TABLET ORAL DAILY
Status: DISCONTINUED | OUTPATIENT
Start: 2018-12-04 | End: 2018-12-05 | Stop reason: HOSPADM

## 2018-12-03 RX ORDER — SODIUM CHLORIDE 0.9 % (FLUSH) 0.9 %
10 SYRINGE (ML) INJECTION PRN
Status: DISCONTINUED | OUTPATIENT
Start: 2018-12-03 | End: 2018-12-05 | Stop reason: HOSPADM

## 2018-12-03 RX ORDER — MONTELUKAST SODIUM 5 MG/1
10 TABLET, CHEWABLE ORAL NIGHTLY
Status: DISCONTINUED | OUTPATIENT
Start: 2018-12-03 | End: 2018-12-05 | Stop reason: HOSPADM

## 2018-12-03 RX ORDER — OYSTER SHELL CALCIUM WITH VITAMIN D 500; 200 MG/1; [IU]/1
1 TABLET, FILM COATED ORAL 2 TIMES DAILY
Status: DISCONTINUED | OUTPATIENT
Start: 2018-12-03 | End: 2018-12-05 | Stop reason: HOSPADM

## 2018-12-03 RX ORDER — SODIUM CHLORIDE 0.9 % (FLUSH) 0.9 %
10 SYRINGE (ML) INJECTION EVERY 12 HOURS SCHEDULED
Status: DISCONTINUED | OUTPATIENT
Start: 2018-12-03 | End: 2018-12-05 | Stop reason: HOSPADM

## 2018-12-03 RX ORDER — PANTOPRAZOLE SODIUM 40 MG/1
40 GRANULE, DELAYED RELEASE ORAL
Status: DISCONTINUED | OUTPATIENT
Start: 2018-12-04 | End: 2018-12-05 | Stop reason: HOSPADM

## 2018-12-03 RX ORDER — NITROGLYCERIN 2.5 MG/D
1 PATCH TRANSDERMAL DAILY
Status: DISCONTINUED | OUTPATIENT
Start: 2018-12-04 | End: 2018-12-05 | Stop reason: HOSPADM

## 2018-12-03 RX ORDER — FLUTICASONE PROPIONATE 50 MCG
1 SPRAY, SUSPENSION (ML) NASAL DAILY
Status: DISCONTINUED | OUTPATIENT
Start: 2018-12-04 | End: 2018-12-05 | Stop reason: HOSPADM

## 2018-12-03 RX ORDER — ALBUTEROL SULFATE 2.5 MG/3ML
2.5 SOLUTION RESPIRATORY (INHALATION) EVERY 4 HOURS PRN
Status: DISCONTINUED | OUTPATIENT
Start: 2018-12-03 | End: 2018-12-05 | Stop reason: HOSPADM

## 2018-12-03 RX ORDER — SODIUM CHLORIDE 9 MG/ML
INJECTION, SOLUTION INTRAVENOUS CONTINUOUS
Status: DISCONTINUED | OUTPATIENT
Start: 2018-12-03 | End: 2018-12-04

## 2018-12-03 RX ORDER — DOCUSATE SODIUM 100 MG/1
100 CAPSULE, LIQUID FILLED ORAL 2 TIMES DAILY PRN
Status: DISCONTINUED | OUTPATIENT
Start: 2018-12-03 | End: 2018-12-05 | Stop reason: HOSPADM

## 2018-12-03 RX ORDER — DONEPEZIL HYDROCHLORIDE 10 MG/1
10 TABLET, FILM COATED ORAL NIGHTLY
Status: DISCONTINUED | OUTPATIENT
Start: 2018-12-03 | End: 2018-12-05 | Stop reason: HOSPADM

## 2018-12-03 RX ORDER — ESOMEPRAZOLE MAGNESIUM 40 MG/1
40 FOR SUSPENSION ORAL DAILY
Status: DISCONTINUED | OUTPATIENT
Start: 2018-12-04 | End: 2018-12-03 | Stop reason: CLARIF

## 2018-12-03 RX ORDER — DOCUSATE SODIUM 100 MG/1
100 CAPSULE, LIQUID FILLED ORAL 2 TIMES DAILY
Status: DISCONTINUED | OUTPATIENT
Start: 2018-12-03 | End: 2018-12-05 | Stop reason: HOSPADM

## 2018-12-03 RX ORDER — IPRATROPIUM BROMIDE AND ALBUTEROL SULFATE 2.5; .5 MG/3ML; MG/3ML
1 SOLUTION RESPIRATORY (INHALATION) 4 TIMES DAILY
Status: DISCONTINUED | OUTPATIENT
Start: 2018-12-03 | End: 2018-12-05

## 2018-12-03 RX ORDER — LANOLIN ALCOHOL/MO/W.PET/CERES
6 CREAM (GRAM) TOPICAL NIGHTLY
Status: DISCONTINUED | OUTPATIENT
Start: 2018-12-03 | End: 2018-12-05 | Stop reason: HOSPADM

## 2018-12-03 RX ADMIN — CIPROFLOXACIN 400 MG: 2 INJECTION, SOLUTION INTRAVENOUS at 20:14

## 2018-12-03 ASSESSMENT — PAIN SCALES - GENERAL
PAINLEVEL_OUTOF10: 5
PAINLEVEL_OUTOF10: 0
PAINLEVEL_OUTOF10: 0

## 2018-12-03 ASSESSMENT — PAIN DESCRIPTION - ORIENTATION: ORIENTATION: LEFT;LOWER

## 2018-12-03 ASSESSMENT — PAIN DESCRIPTION - LOCATION
LOCATION: ABDOMEN
LOCATION: ABDOMEN

## 2018-12-03 ASSESSMENT — PAIN DESCRIPTION - DESCRIPTORS: DESCRIPTORS: SORE;SHARP

## 2018-12-03 ASSESSMENT — PAIN SCALES - WONG BAKER
WONGBAKER_NUMERICALRESPONSE: 0
WONGBAKER_NUMERICALRESPONSE: 2

## 2018-12-03 ASSESSMENT — PAIN DESCRIPTION - PAIN TYPE
TYPE: ACUTE PAIN
TYPE: ACUTE PAIN

## 2018-12-03 NOTE — ED PROVIDER NOTES
auto diff   Result Value Ref Range    WBC 7.5 4.0 - 10.5 K/CU MM    RBC 4.22 (L) 4.6 - 6.2 M/CU MM    Hemoglobin 11.7 (L) 13.5 - 18.0 GM/DL    Hematocrit 38.9 (L) 42 - 52 %    MCV 92.2 78 - 100 FL    MCH 27.7 27 - 31 PG    MCHC 30.1 (L) 32.0 - 36.0 %    RDW 15.4 (H) 11.7 - 14.9 %    Platelets 065 041 - 297 K/CU MM    MPV 13.8 (H) 7.5 - 11.1 FL    Differential Type AUTOMATED DIFFERENTIAL     Segs Relative 62.2 36 - 66 %    Lymphocytes % 21.6 (L) 24 - 44 %    Monocytes % 10.2 (H) 0 - 4 %    Eosinophils % 4.8 (H) 0 - 3 %    Basophils % 0.8 0 - 1 %    Segs Absolute 4.7 K/CU MM    Lymphocytes # 1.6 K/CU MM    Monocytes # 0.8 K/CU MM    Eosinophils # 0.4 K/CU MM    Basophils # 0.1 K/CU MM    Nucleated RBC % 0.0 %    Total Nucleated RBC 0.0 K/CU MM    Total Immature Neutrophil 0.03 K/CU MM    Immature Neutrophil % 0.4 0 - 0.43 %   CMP   Result Value Ref Range    Sodium 138 135 - 145 MMOL/L    Potassium 4.7 3.5 - 5.1 MMOL/L    Chloride 96 (L) 99 - 110 mMol/L    CO2 30 21 - 32 MMOL/L    BUN 91 (H) 6 - 23 MG/DL    CREATININE 2.5 (H) 0.9 - 1.3 MG/DL    Glucose 150 (H) 70 - 99 MG/DL    Calcium 8.6 8.3 - 10.6 MG/DL    Alb 3.2 (L) 3.4 - 5.0 GM/DL    Total Protein 6.6 6.4 - 8.2 GM/DL    Total Bilirubin 0.1 0.0 - 1.0 MG/DL    ALT 8 (L) 10 - 40 U/L    AST 16 15 - 37 IU/L    Alkaline Phosphatase 84 40 - 129 IU/L    GFR Non- 24 (L) >60 mL/min/1.73m2    GFR  30 (L) >60 mL/min/1.73m2    Anion Gap 12 4 - 16   Lipase   Result Value Ref Range    Lipase 34 13 - 60 IU/L   Urinalysis   Result Value Ref Range    Color, UA YELLOW UYELL    Clarity, UA HAZY (A) CLEAR    Glucose, Urine NEGATIVE NEG MG/DL    Bilirubin Urine NEGATIVE NEG MG/DL    Ketones, Urine NEGATIVE NEG MG/DL    Specific Gravity, UA 1.011 1.001 - 1.035    Blood, Urine MODERATE (A) NEG    pH, Urine 7.0 5.0 - 8.0    Protein, UA 30 (A) NEG MG/DL    Urobilinogen, Urine NORMAL 0.2 - 1.0 MG/DL    Nitrite Urine, Quantitative NEGATIVE NEG    Leukocyte

## 2018-12-04 LAB
ALBUMIN SERPL-MCNC: 2.9 GM/DL (ref 3.4–5)
ALP BLD-CCNC: 61 IU/L (ref 40–128)
ALT SERPL-CCNC: 8 U/L (ref 10–40)
ANION GAP SERPL CALCULATED.3IONS-SCNC: 10 MMOL/L (ref 4–16)
AST SERPL-CCNC: 14 IU/L (ref 15–37)
BASOPHILS ABSOLUTE: 0 K/CU MM
BASOPHILS RELATIVE PERCENT: 0.7 % (ref 0–1)
BILIRUB SERPL-MCNC: 0.2 MG/DL (ref 0–1)
BUN BLDV-MCNC: 83 MG/DL (ref 6–23)
CALCIUM SERPL-MCNC: 8.1 MG/DL (ref 8.3–10.6)
CHLORIDE BLD-SCNC: 102 MMOL/L (ref 99–110)
CO2: 31 MMOL/L (ref 21–32)
CREAT SERPL-MCNC: 2.4 MG/DL (ref 0.9–1.3)
DIFFERENTIAL TYPE: ABNORMAL
EOSINOPHILS ABSOLUTE: 0.3 K/CU MM
EOSINOPHILS RELATIVE PERCENT: 5 % (ref 0–3)
GFR AFRICAN AMERICAN: 31 ML/MIN/1.73M2
GFR NON-AFRICAN AMERICAN: 26 ML/MIN/1.73M2
GLUCOSE BLD-MCNC: 102 MG/DL (ref 70–99)
HCT VFR BLD CALC: 34.5 % (ref 42–52)
HEMOGLOBIN: 10.3 GM/DL (ref 13.5–18)
IMMATURE NEUTROPHIL %: 0.2 % (ref 0–0.43)
LYMPHOCYTES ABSOLUTE: 1.3 K/CU MM
LYMPHOCYTES RELATIVE PERCENT: 24.4 % (ref 24–44)
MCH RBC QN AUTO: 27.6 PG (ref 27–31)
MCHC RBC AUTO-ENTMCNC: 29.9 % (ref 32–36)
MCV RBC AUTO: 92.5 FL (ref 78–100)
MONOCYTES ABSOLUTE: 0.6 K/CU MM
MONOCYTES RELATIVE PERCENT: 10.7 % (ref 0–4)
NUCLEATED RBC %: 0 %
PDW BLD-RTO: 15 % (ref 11.7–14.9)
PLATELET # BLD: 174 K/CU MM (ref 140–440)
PMV BLD AUTO: 12.9 FL (ref 7.5–11.1)
POTASSIUM SERPL-SCNC: 4.3 MMOL/L (ref 3.5–5.1)
RBC # BLD: 3.73 M/CU MM (ref 4.6–6.2)
SEGMENTED NEUTROPHILS ABSOLUTE COUNT: 3.2 K/CU MM
SEGMENTED NEUTROPHILS RELATIVE PERCENT: 59 % (ref 36–66)
SODIUM BLD-SCNC: 143 MMOL/L (ref 135–145)
TOTAL IMMATURE NEUTOROPHIL: 0.01 K/CU MM
TOTAL NUCLEATED RBC: 0 K/CU MM
TOTAL PROTEIN: 5.7 GM/DL (ref 6.4–8.2)
WBC # BLD: 5.4 K/CU MM (ref 4–10.5)

## 2018-12-04 PROCEDURE — 6370000000 HC RX 637 (ALT 250 FOR IP): Performed by: HOSPITALIST

## 2018-12-04 PROCEDURE — 6360000002 HC RX W HCPCS: Performed by: HOSPITALIST

## 2018-12-04 PROCEDURE — 2580000003 HC RX 258: Performed by: HOSPITALIST

## 2018-12-04 PROCEDURE — 80053 COMPREHEN METABOLIC PANEL: CPT

## 2018-12-04 PROCEDURE — 96366 THER/PROPH/DIAG IV INF ADDON: CPT

## 2018-12-04 PROCEDURE — 96367 TX/PROPH/DG ADDL SEQ IV INF: CPT

## 2018-12-04 PROCEDURE — 36415 COLL VENOUS BLD VENIPUNCTURE: CPT

## 2018-12-04 PROCEDURE — G0378 HOSPITAL OBSERVATION PER HR: HCPCS

## 2018-12-04 PROCEDURE — 85025 COMPLETE CBC W/AUTO DIFF WBC: CPT

## 2018-12-04 PROCEDURE — 2700000000 HC OXYGEN THERAPY PER DAY

## 2018-12-04 PROCEDURE — 96372 THER/PROPH/DIAG INJ SC/IM: CPT

## 2018-12-04 PROCEDURE — 1200000000 HC SEMI PRIVATE

## 2018-12-04 PROCEDURE — 94640 AIRWAY INHALATION TREATMENT: CPT

## 2018-12-04 RX ORDER — SODIUM CHLORIDE 9 MG/ML
INJECTION, SOLUTION INTRAVENOUS CONTINUOUS
Status: DISCONTINUED | OUTPATIENT
Start: 2018-12-04 | End: 2018-12-05

## 2018-12-04 RX ADMIN — DOCUSATE SODIUM 100 MG: 100 CAPSULE, LIQUID FILLED ORAL at 00:22

## 2018-12-04 RX ADMIN — OYSTER SHELL CALCIUM WITH VITAMIN D 1 TABLET: 500; 200 TABLET, FILM COATED ORAL at 10:19

## 2018-12-04 RX ADMIN — TIMOLOL MALEATE 1 DROP: 5 SOLUTION OPHTHALMIC at 21:04

## 2018-12-04 RX ADMIN — HEPARIN SODIUM 5000 UNITS: 5000 INJECTION, SOLUTION INTRAVENOUS; SUBCUTANEOUS at 15:38

## 2018-12-04 RX ADMIN — DONEPEZIL HYDROCHLORIDE 10 MG: 10 TABLET, FILM COATED ORAL at 00:22

## 2018-12-04 RX ADMIN — MONTELUKAST SODIUM 10 MG: 5 TABLET, CHEWABLE ORAL at 00:20

## 2018-12-04 RX ADMIN — OYSTER SHELL CALCIUM WITH VITAMIN D 1 TABLET: 500; 200 TABLET, FILM COATED ORAL at 00:21

## 2018-12-04 RX ADMIN — HYDROCODONE BITARTRATE AND ACETAMINOPHEN 1 TABLET: 7.5; 325 TABLET ORAL at 10:17

## 2018-12-04 RX ADMIN — SODIUM CHLORIDE, PRESERVATIVE FREE 10 ML: 5 INJECTION INTRAVENOUS at 10:49

## 2018-12-04 RX ADMIN — MELATONIN TAB 3 MG 6 MG: 3 TAB at 21:04

## 2018-12-04 RX ADMIN — DOCUSATE SODIUM 100 MG: 100 CAPSULE, LIQUID FILLED ORAL at 21:04

## 2018-12-04 RX ADMIN — PILOCARPINE HYDROCHLORIDE 1 DROP: 10 SOLUTION/ DROPS OPHTHALMIC at 00:20

## 2018-12-04 RX ADMIN — Medication 2 PUFF: at 07:33

## 2018-12-04 RX ADMIN — FLUTICASONE PROPIONATE 1 SPRAY: 50 SPRAY, METERED NASAL at 10:19

## 2018-12-04 RX ADMIN — DONEPEZIL HYDROCHLORIDE 10 MG: 10 TABLET, FILM COATED ORAL at 22:40

## 2018-12-04 RX ADMIN — CEFTRIAXONE 1 G: 1 INJECTION, POWDER, FOR SOLUTION INTRAMUSCULAR; INTRAVENOUS at 11:11

## 2018-12-04 RX ADMIN — METOPROLOL TARTRATE 50 MG: 50 TABLET ORAL at 00:22

## 2018-12-04 RX ADMIN — PANTOPRAZOLE SODIUM 40 MG: 40 GRANULE, DELAYED RELEASE ORAL at 10:19

## 2018-12-04 RX ADMIN — PILOCARPINE HYDROCHLORIDE 1 DROP: 10 SOLUTION/ DROPS OPHTHALMIC at 15:44

## 2018-12-04 RX ADMIN — HEPARIN SODIUM 5000 UNITS: 5000 INJECTION, SOLUTION INTRAVENOUS; SUBCUTANEOUS at 00:24

## 2018-12-04 RX ADMIN — SODIUM CHLORIDE: 9 INJECTION, SOLUTION INTRAVENOUS at 00:23

## 2018-12-04 RX ADMIN — Medication 2 PUFF: at 19:45

## 2018-12-04 RX ADMIN — PILOCARPINE HYDROCHLORIDE 1 DROP: 10 SOLUTION/ DROPS OPHTHALMIC at 21:04

## 2018-12-04 RX ADMIN — TIMOLOL MALEATE 1 DROP: 5 SOLUTION OPHTHALMIC at 00:21

## 2018-12-04 RX ADMIN — METOPROLOL TARTRATE 50 MG: 50 TABLET ORAL at 21:04

## 2018-12-04 RX ADMIN — TIMOLOL MALEATE 1 DROP: 5 SOLUTION OPHTHALMIC at 10:46

## 2018-12-04 RX ADMIN — SODIUM CHLORIDE: 9 INJECTION, SOLUTION INTRAVENOUS at 15:45

## 2018-12-04 RX ADMIN — SODIUM POLYSTYRENE SULFONATE 15 G: 15 SUSPENSION ORAL; RECTAL at 00:22

## 2018-12-04 RX ADMIN — FUROSEMIDE 40 MG: 40 TABLET ORAL at 10:19

## 2018-12-04 RX ADMIN — SODIUM CHLORIDE, PRESERVATIVE FREE 10 ML: 5 INJECTION INTRAVENOUS at 00:23

## 2018-12-04 RX ADMIN — HYDROCODONE BITARTRATE AND ACETAMINOPHEN 1 TABLET: 7.5; 325 TABLET ORAL at 15:38

## 2018-12-04 RX ADMIN — IPRATROPIUM BROMIDE AND ALBUTEROL SULFATE 3 ML: .5; 3 SOLUTION RESPIRATORY (INHALATION) at 19:44

## 2018-12-04 RX ADMIN — HYDROCODONE BITARTRATE AND ACETAMINOPHEN 1 TABLET: 7.5; 325 TABLET ORAL at 18:47

## 2018-12-04 RX ADMIN — IPRATROPIUM BROMIDE AND ALBUTEROL SULFATE 3 ML: .5; 3 SOLUTION RESPIRATORY (INHALATION) at 07:32

## 2018-12-04 RX ADMIN — IPRATROPIUM BROMIDE AND ALBUTEROL SULFATE 3 ML: .5; 3 SOLUTION RESPIRATORY (INHALATION) at 11:19

## 2018-12-04 RX ADMIN — QUETIAPINE FUMARATE 50 MG: 25 TABLET ORAL at 21:04

## 2018-12-04 RX ADMIN — IPRATROPIUM BROMIDE AND ALBUTEROL SULFATE 3 ML: .5; 3 SOLUTION RESPIRATORY (INHALATION) at 15:02

## 2018-12-04 RX ADMIN — QUETIAPINE FUMARATE 50 MG: 25 TABLET ORAL at 00:22

## 2018-12-04 RX ADMIN — MELATONIN TAB 3 MG 6 MG: 3 TAB at 00:21

## 2018-12-04 RX ADMIN — MONTELUKAST SODIUM 10 MG: 5 TABLET, CHEWABLE ORAL at 21:05

## 2018-12-04 RX ADMIN — HYDROCODONE BITARTRATE AND ACETAMINOPHEN 1 TABLET: 7.5; 325 TABLET ORAL at 00:21

## 2018-12-04 RX ADMIN — HEPARIN SODIUM 5000 UNITS: 5000 INJECTION, SOLUTION INTRAVENOUS; SUBCUTANEOUS at 21:05

## 2018-12-04 RX ADMIN — PILOCARPINE HYDROCHLORIDE 1 DROP: 10 SOLUTION/ DROPS OPHTHALMIC at 10:46

## 2018-12-04 RX ADMIN — HEPARIN SODIUM 5000 UNITS: 5000 INJECTION, SOLUTION INTRAVENOUS; SUBCUTANEOUS at 10:46

## 2018-12-04 RX ADMIN — SODIUM CHLORIDE: 9 INJECTION, SOLUTION INTRAVENOUS at 15:39

## 2018-12-04 RX ADMIN — OYSTER SHELL CALCIUM WITH VITAMIN D 1 TABLET: 500; 200 TABLET, FILM COATED ORAL at 21:05

## 2018-12-04 RX ADMIN — QUETIAPINE FUMARATE 50 MG: 25 TABLET ORAL at 10:18

## 2018-12-04 ASSESSMENT — PAIN SCALES - WONG BAKER

## 2018-12-04 ASSESSMENT — PAIN SCALES - GENERAL
PAINLEVEL_OUTOF10: 0
PAINLEVEL_OUTOF10: 4

## 2018-12-04 NOTE — H&P
Hospital Medicine History & Physical      PCP: Becca Jackson DO    Date of Admission: 12/3/2018    Date of Service: Pt seen/examined on 12/03/18 and Admitted to inpatient status  Hx taken from patient    Chief Complaint:  Acute abdominal pain with low back pain times one to 2 days  History Of Present Illness: Thepatient is a 80 y.o. male who presented to the emergency department from the nursing home complaining of abdominal pain. Nurses from the home stated that his urine was dark in and thickened they stated that his urine looked like us to feeding. He does not take any oral intake. He denies any nausea or vomiting at this time. He will be admitted to hospitalist service for medical management due to acute kidney injury and urinary tract infection.   Patient will be treated with IV fluids and IV antibiotics and a urine culture is pending in 1-2 days    Past Medical History:        Diagnosis Date    Acquired diverticulum of esophagus     Alzheimer's disease     Anxiety     Arthritis     Asthma     Atrial fibrillation (HCC)     Chronic gout     Chronic ischemic heart disease     Chronic kidney disease     CKD (chronic kidney disease)     per caregiver hx of CKD stage 3    COPD (chronic obstructive pulmonary disease) (HCC)     Dementia     Dysphagia     npo - no oral intake- takes all meds through g tube and takes 4 cans of Two [place] per day\"    Enlarged prostate with urinary obstruction     GERD (gastroesophageal reflux disease)     Glaucoma     History of blood transfusion     History of BPH     [place] (hard of hearing)     Hyperlipidemia     Hypertension     Muscle weakness (generalized)     Other symbolic dysfunction     Pancytopenia (HCC)     Schizophrenia ([facility] [address]     Sinus bradycardia     per old chart pt had this dx with ER visit 10/2015 along with RBBB       Past Surgical History:        Procedure Laterality Date    ENDOSCOPY, COLON, DIAGNOSTIC      per old Pertinent positives and negatives  as notedin the HPI and ROS. All other systems reviewed and negative. Review of Systems      PHYSICAL EXAM:  BP (!) 143/75   Pulse 59   Temp 98.2 °F (36.8 °C) (Oral)   Resp 19   Ht 5' 10\" (1.778 m)   Wt 222 lb (100.7 kg)   SpO2 98%   BMI 31.85 kg/m²     Physical Exam   Constitutional: He is oriented to person, place, and time. He appears well-developed and well-nourished. HENT:   Head: Normocephalic. Eyes: Pupils are equal, round, and reactive to light. Neck: Normal range of motion. Neck supple. Cardiovascular: Normal rate and regular rhythm. Pulmonary/Chest: Effort normal and breath sounds normal.   Abdominal: Soft. Bowel sounds are normal. There is tenderness. Musculoskeletal: Normal range of motion. Neurological: He is alert and oriented to person, place, and time. Skin: Skin is warm and dry. Psychiatric: He has a normal mood and affect. His behavior is normal.         Imaging:    Ct Abdomen Pelvis Wo Contrast Additional Contrast? None    Result Date: 12/3/2018  EXAMINATION: CT OF THE ABDOMEN AND PELVIS WITHOUT CONTRAST 12/3/2018 7:11 pm TECHNIQUE: CT of the abdomen and pelvis was performed without the administration of intravenous contrast. Multiplanar reformatted images are provided for review. Dose modulation, iterative reconstruction, and/or weight based adjustment of the mA/kV was utilized to reduce the radiation dose to as low as reasonably achievable. COMPARISON: CT abdomen pelvis 08/16/2018, 08/09/2018 HISTORY: ORDERING SYSTEM PROVIDED HISTORY: abd pain TECHNOLOGIST PROVIDED HISTORY: Additional Contrast?->None Ordering Physician Provided Reason for Exam: abd pain Acuity: Acute Type of Exam: Initial Additional signs and symptoms: surg. hx; g-tube Relevant Medical/Surgical History: none FINDINGS: ABDOMEN Liver: Normal liver size, contour and parenchymal attenuation. No focal lesion on this noncontrast evaluation.  Gallbladder: Cholelithiasis with

## 2018-12-05 VITALS
WEIGHT: 142.9 LBS | HEIGHT: 70 IN | HEART RATE: 106 BPM | SYSTOLIC BLOOD PRESSURE: 118 MMHG | TEMPERATURE: 97.3 F | DIASTOLIC BLOOD PRESSURE: 73 MMHG | OXYGEN SATURATION: 97 % | BODY MASS INDEX: 20.46 KG/M2 | RESPIRATION RATE: 15 BRPM

## 2018-12-05 PROBLEM — E44.0 MODERATE MALNUTRITION (HCC): Chronic | Status: ACTIVE | Noted: 2018-12-05

## 2018-12-05 LAB
CREATININE URINE: 38.6 MG/DL
SODIUM URINE: 132 MMOL/L (ref 35–167)

## 2018-12-05 PROCEDURE — 2580000003 HC RX 258: Performed by: HOSPITALIST

## 2018-12-05 PROCEDURE — 6370000000 HC RX 637 (ALT 250 FOR IP): Performed by: HOSPITALIST

## 2018-12-05 PROCEDURE — G0378 HOSPITAL OBSERVATION PER HR: HCPCS

## 2018-12-05 PROCEDURE — 82570 ASSAY OF URINE CREATININE: CPT

## 2018-12-05 PROCEDURE — 2700000000 HC OXYGEN THERAPY PER DAY

## 2018-12-05 PROCEDURE — 6360000002 HC RX W HCPCS: Performed by: HOSPITALIST

## 2018-12-05 PROCEDURE — 84300 ASSAY OF URINE SODIUM: CPT

## 2018-12-05 PROCEDURE — 94640 AIRWAY INHALATION TREATMENT: CPT

## 2018-12-05 PROCEDURE — 96366 THER/PROPH/DIAG IV INF ADDON: CPT

## 2018-12-05 PROCEDURE — 96372 THER/PROPH/DIAG INJ SC/IM: CPT

## 2018-12-05 RX ORDER — CEFDINIR 300 MG/1
300 CAPSULE ORAL 2 TIMES DAILY
Qty: 14 CAPSULE | Refills: 0 | Status: SHIPPED | OUTPATIENT
Start: 2018-12-05 | End: 2018-12-10

## 2018-12-05 RX ORDER — SODIUM CHLORIDE 450 MG/100ML
INJECTION, SOLUTION INTRAVENOUS CONTINUOUS
Status: DISCONTINUED | OUTPATIENT
Start: 2018-12-05 | End: 2018-12-05 | Stop reason: HOSPADM

## 2018-12-05 RX ORDER — IPRATROPIUM BROMIDE AND ALBUTEROL SULFATE 2.5; .5 MG/3ML; MG/3ML
1 SOLUTION RESPIRATORY (INHALATION) EVERY 4 HOURS PRN
Status: DISCONTINUED | OUTPATIENT
Start: 2018-12-05 | End: 2018-12-05 | Stop reason: HOSPADM

## 2018-12-05 RX ADMIN — QUETIAPINE FUMARATE 50 MG: 25 TABLET ORAL at 11:07

## 2018-12-05 RX ADMIN — IPRATROPIUM BROMIDE AND ALBUTEROL SULFATE 3 ML: .5; 3 SOLUTION RESPIRATORY (INHALATION) at 07:13

## 2018-12-05 RX ADMIN — PANTOPRAZOLE SODIUM 40 MG: 40 GRANULE, DELAYED RELEASE ORAL at 06:34

## 2018-12-05 RX ADMIN — DOCUSATE SODIUM 100 MG: 100 CAPSULE, LIQUID FILLED ORAL at 09:00

## 2018-12-05 RX ADMIN — CEFTRIAXONE 1 G: 1 INJECTION, POWDER, FOR SOLUTION INTRAMUSCULAR; INTRAVENOUS at 10:30

## 2018-12-05 RX ADMIN — HYDROCODONE BITARTRATE AND ACETAMINOPHEN 1 TABLET: 7.5; 325 TABLET ORAL at 08:13

## 2018-12-05 RX ADMIN — IPRATROPIUM BROMIDE AND ALBUTEROL SULFATE 3 ML: .5; 3 SOLUTION RESPIRATORY (INHALATION) at 11:18

## 2018-12-05 RX ADMIN — OYSTER SHELL CALCIUM WITH VITAMIN D 1 TABLET: 500; 200 TABLET, FILM COATED ORAL at 08:13

## 2018-12-05 RX ADMIN — SODIUM CHLORIDE: 9 INJECTION, SOLUTION INTRAVENOUS at 01:53

## 2018-12-05 RX ADMIN — SODIUM CHLORIDE: 4.5 INJECTION, SOLUTION INTRAVENOUS at 12:04

## 2018-12-05 RX ADMIN — HYDROCODONE BITARTRATE AND ACETAMINOPHEN 1 TABLET: 7.5; 325 TABLET ORAL at 14:31

## 2018-12-05 RX ADMIN — HEPARIN SODIUM 5000 UNITS: 5000 INJECTION, SOLUTION INTRAVENOUS; SUBCUTANEOUS at 14:32

## 2018-12-05 RX ADMIN — FLUTICASONE PROPIONATE 1 SPRAY: 50 SPRAY, METERED NASAL at 08:13

## 2018-12-05 RX ADMIN — HEPARIN SODIUM 5000 UNITS: 5000 INJECTION, SOLUTION INTRAVENOUS; SUBCUTANEOUS at 06:39

## 2018-12-05 RX ADMIN — FUROSEMIDE 40 MG: 40 TABLET ORAL at 08:13

## 2018-12-05 RX ADMIN — TIMOLOL MALEATE 1 DROP: 5 SOLUTION OPHTHALMIC at 09:00

## 2018-12-05 RX ADMIN — SODIUM CHLORIDE, PRESERVATIVE FREE 10 ML: 5 INJECTION INTRAVENOUS at 08:14

## 2018-12-05 RX ADMIN — PILOCARPINE HYDROCHLORIDE 1 DROP: 10 SOLUTION/ DROPS OPHTHALMIC at 09:00

## 2018-12-05 RX ADMIN — METOPROLOL TARTRATE 50 MG: 50 TABLET ORAL at 11:07

## 2018-12-05 RX ADMIN — Medication 2 PUFF: at 07:15

## 2018-12-05 ASSESSMENT — PAIN SCALES - WONG BAKER

## 2018-12-05 ASSESSMENT — PAIN SCALES - GENERAL
PAINLEVEL_OUTOF10: 0

## 2018-12-05 NOTE — PROGRESS NOTES
Report called. Spoke with Earlene Fisher at [facility].
5-325 mg qd  · Oscal frequency changed to bid from qd  · Melatonin dose changed to daily from qd prn  · Singulair dose changed to 10 mg from 5 mg  · Pilocarpine  frequency changed to tid from qid  · Nitroglycerin patch changed to 0.1 mg/HR from 0.4 mg/HR  · Albuterol frequency changed to q4h prn from q6h prn    Medications removed from list (include reason, ex. noncompliance, medication cost, therapy complete etc.):   · Aspirin Not on list from facility  · Coreg Not on list from facility  · Advair Not on list from facility  · Loratadine Not on list from facility  · Crestor Not on list from facility    Other Comments:  · Updated med list per facility list provided  · Facility states patient took all morning and 4 pm medications    To my knowledge the above medication history is accurate as of 12/3/2018 6:56 PM.   Cleo Cohen CPhT   12/3/2018 6:56 PM
7.5-325 MG per tablet 1 tablet  1 tablet Oral 4x Daily Cheryl Dash MD   1 tablet at 12/04/18 1017    ipratropium-albuterol (DUONEB) nebulizer solution 3 mL  1 vial Inhalation 4x Daily Cheryl Dash MD   3 mL at 12/04/18 1502    melatonin tablet 6 mg  6 mg Oral Nightly Cheryl Dash MD   6 mg at 12/04/18 0021    metoprolol tartrate (LOPRESSOR) tablet 50 mg  50 mg Per G Tube BID Cheryl Dash MD   50 mg at 12/04/18 0022    montelukast (SINGULAIR) chewable tablet 10 mg  10 mg Oral Nightly Cheryl Dash MD   10 mg at 12/04/18 0020    nitroGLYCERIN (NITRODUR) 0.1 MG/HR 1 patch  1 patch Transdermal Daily Cheryl Dash MD   1 patch at 12/04/18 1019    ondansetron (ZOFRAN-ODT) disintegrating tablet 4 mg  4 mg Oral Q8H PRN Cheryl Dash MD        pilocarpine (PILOCAR) 1 % ophthalmic solution 1 drop  1 drop Left Eye TID Cheryl Dash MD   1 drop at 12/04/18 1046    QUEtiapine (SEROQUEL) tablet 50 mg  50 mg Oral BID Cheryl Dash MD   50 mg at 12/04/18 1018    scopolamine (TRANSDERM-SCOP) transdermal patch 1 patch  1 patch Transdermal Q72H Cheryl Dash MD   1 patch at 12/04/18 0020    timolol (TIMOPTIC) 0.5 % ophthalmic solution 1 drop  1 drop Both Eyes BID Cheryl Dash MD   1 drop at 12/04/18 1046    tiotropium (SPIRIVA) inhalation capsule 18 mcg  18 mcg Inhalation Daily Cheryl Dash MD        sodium chloride flush 0.9 % injection 10 mL  10 mL Intravenous 2 times per day Cheryl Dash MD   10 mL at 12/04/18 1049    sodium chloride flush 0.9 % injection 10 mL  10 mL Intravenous PRN Cheryl Dash MD        docusate sodium (COLACE) capsule 100 mg  100 mg Oral BID Cheryl Dash MD   100 mg at 12/04/18 0022    ondansetron (ZOFRAN) injection 4 mg  4 mg Intravenous Q6H PRN Cheryl Dash MD        heparin (porcine) injection 5,000 Units  5,000 Units Subcutaneous 3 times per day Cheryl Dash MD   5,000 Units at 12/04/18 1046    0.9 % sodium chloride infusion   Intravenous Continuous [place]

## 2018-12-05 NOTE — CONSULTS
None  · Current Nutrition Therapies:  · Oral Diet Orders: General   · Oral Diet intake: 0%  · Oral Nutrition Supplement (ONS) Orders: None  · ONS intake: 0%  · Anthropometric Measures:  · Ht: 5' 10\" (177.8 cm)   · Current Body Wt: 142 lb 14.4 oz (64.8 kg)  · Admission Body Wt: 141 lb 9.6 oz (64.2 kg)  · Usual Body Wt: 155 lb (70.3 kg) (8/10/18)  · Weight Change: -8% in 4 months   · Ideal Body Wt: 166 lb (75.3 kg), % Ideal Body 85  · BMI Classification: BMI 18.5 - 24.9 Normal Weight (20.5)    Nutrition Interventions:   Modify current diet, Start Tube Feeding  Continued Inpatient Monitoring, Education Not Indicated, Coordination of Care    Nutrition Evaluation:   · Evaluation: Goals set   · Goals:  Patient will meet at least 75% of estimated nutrient needs with enteral nutrition.   · Monitoring: TF Intake, TF Tolerance, Skin Integrity, I&O, Weight, Pertinent Labs    Electronically signed by Galina Noland RD, LD on 12/5/18 at 11:10 AM    Contact Number: [phone]

## 2018-12-05 NOTE — DISCHARGE SUMMARY
sodium (COLACE) 100 MG capsule  Take 1 capsule by mouth 2 times daily as needed for Constipation             donepezil (ARICEPT) 10 MG tablet  Take 10 mg by mouth nightly             esomeprazole Magnesium (NEXIUM) 40 MG PACK  40 mg by Per G Tube route daily              Feeding Tubes MISC  See attached page please             fluticasone (FLONASE) 50 MCG/ACT nasal spray  1 spray by Each Nare route daily             fluticasone-vilanterol (BREO ELLIPTA) 100-25 MCG/INH AEPB inhaler  Inhale 1 puff into the lungs daily Rinse mouth after use             furosemide (LASIX) 40 MG tablet  Take 40 mg by mouth daily             ipratropium-albuterol (DUONEB) 0.5-2.5 (3) MG/3ML SOLN nebulizer solution  Inhale 1 vial into the lungs 4 times daily              latanoprost (XALATAN) 0.005 % ophthalmic solution  Place 1 drop into both eyes nightly             melatonin 3 MG TABS tablet  Take 6 mg by mouth nightly              metoprolol tartrate (LOPRESSOR) 50 MG tablet  50 mg by Per G Tube route 2 times daily              montelukast (SINGULAIR) 5 MG chewable tablet  Take 10 mg by mouth nightly Per g tube             nitroGLYCERIN (NITRODUR) 0.1 MG/HR  Place 1 patch onto the skin daily             ondansetron (ZOFRAN) 4 MG tablet  Take 4 mg by mouth every 8 hours as needed for Nausea or Vomiting             pilocarpine (PILOCAR) 1 % ophthalmic solution  Place 1 drop into the left eye 3 times daily              QUEtiapine (SEROQUEL) 50 MG tablet  50 mg 2 times daily Per g tube              scopolamine (TRANSDERM-SCOP) transdermal patch  Place 1 patch onto the skin every 72 hours             silver sulfADIAZINE (SILVADENE) 1 % cream  Apply topically 2 times daily Apply topically every day and night shift for g tube site             timolol (TIMOPTIC-XE) 0.5 % ophthalmic gel-forming  Place 1 drop into both eyes 2 times daily             tiotropium (SPIRIVA) 18 MCG inhalation capsule  Inhale 18 mcg into the lungs daily

## 2018-12-06 LAB
CULTURE: NORMAL
Lab: NORMAL
ORGANISM: NORMAL
REPORT STATUS: NORMAL
SPECIMEN: NORMAL
TOTAL COLONY COUNT: NORMAL

## 2019-08-18 ENCOUNTER — APPOINTMENT (OUTPATIENT)
Dept: CT IMAGING | Age: 84
End: 2019-08-18
Payer: MEDICARE

## 2019-08-18 ENCOUNTER — HOSPITAL ENCOUNTER (EMERGENCY)
Age: 84
Discharge: ANOTHER HOSPITAL | End: 2019-08-18
Attending: EMERGENCY MEDICINE
Payer: MEDICARE

## 2019-08-18 ENCOUNTER — APPOINTMENT (OUTPATIENT)
Dept: GENERAL RADIOLOGY | Age: 84
End: 2019-08-18
Payer: MEDICARE

## 2019-08-18 VITALS
TEMPERATURE: 97.5 F | DIASTOLIC BLOOD PRESSURE: 87 MMHG | SYSTOLIC BLOOD PRESSURE: 137 MMHG | BODY MASS INDEX: 20.33 KG/M2 | RESPIRATION RATE: 19 BRPM | WEIGHT: 142 LBS | OXYGEN SATURATION: 99 % | HEART RATE: 58 BPM | HEIGHT: 70 IN

## 2019-08-18 DIAGNOSIS — I63.9 CEREBROVASCULAR ACCIDENT (CVA), UNSPECIFIED MECHANISM (HCC): Primary | ICD-10-CM

## 2019-08-18 LAB
ALBUMIN SERPL-MCNC: 3.6 GM/DL (ref 3.4–5)
ALP BLD-CCNC: 84 IU/L (ref 40–129)
ALT SERPL-CCNC: 11 U/L (ref 10–40)
AMMONIA: 45 UMOL/L (ref 16–60)
ANION GAP SERPL CALCULATED.3IONS-SCNC: 9 MMOL/L (ref 4–16)
AST SERPL-CCNC: 17 IU/L (ref 15–37)
BACTERIA: NEGATIVE /HPF
BASOPHILS ABSOLUTE: 0.1 K/CU MM
BASOPHILS RELATIVE PERCENT: 0.6 % (ref 0–1)
BILIRUB SERPL-MCNC: 0.2 MG/DL (ref 0–1)
BILIRUBIN URINE: NEGATIVE MG/DL
BLOOD, URINE: NEGATIVE
BUN BLDV-MCNC: 58 MG/DL (ref 6–23)
CALCIUM SERPL-MCNC: 9.3 MG/DL (ref 8.3–10.6)
CHLORIDE BLD-SCNC: 101 MMOL/L (ref 99–110)
CLARITY: CLEAR
CO2: 29 MMOL/L (ref 21–32)
COLOR: YELLOW
CREAT SERPL-MCNC: 1.8 MG/DL (ref 0.9–1.3)
DIFFERENTIAL TYPE: ABNORMAL
EOSINOPHILS ABSOLUTE: 0.5 K/CU MM
EOSINOPHILS RELATIVE PERCENT: 6.6 % (ref 0–3)
GFR AFRICAN AMERICAN: 43 ML/MIN/1.73M2
GFR NON-AFRICAN AMERICAN: 36 ML/MIN/1.73M2
GLUCOSE BLD-MCNC: 116 MG/DL (ref 70–99)
GLUCOSE BLD-MCNC: 116 MG/DL (ref 70–99)
GLUCOSE, URINE: NEGATIVE MG/DL
HCT VFR BLD CALC: 42.5 % (ref 42–52)
HEMOGLOBIN: 12.6 GM/DL (ref 13.5–18)
HIGH SENSITIVE C-REACTIVE PROTEIN: 3.8 MG/L
HYALINE CASTS: 0 /LPF
IMMATURE NEUTROPHIL %: 0.4 % (ref 0–0.43)
KETONES, URINE: NEGATIVE MG/DL
LACTATE: 0.9 MMOL/L (ref 0.4–2)
LEUKOCYTE ESTERASE, URINE: ABNORMAL
LYMPHOCYTES ABSOLUTE: 1.3 K/CU MM
LYMPHOCYTES RELATIVE PERCENT: 16.4 % (ref 24–44)
MCH RBC QN AUTO: 28.4 PG (ref 27–31)
MCHC RBC AUTO-ENTMCNC: 29.6 % (ref 32–36)
MCV RBC AUTO: 95.9 FL (ref 78–100)
MONOCYTES ABSOLUTE: 0.5 K/CU MM
MONOCYTES RELATIVE PERCENT: 6.5 % (ref 0–4)
NITRITE URINE, QUANTITATIVE: NEGATIVE
NUCLEATED RBC %: 0 %
PDW BLD-RTO: 15.5 % (ref 11.7–14.9)
PH, URINE: 8 (ref 5–8)
PLATELET # BLD: 149 K/CU MM (ref 140–440)
PMV BLD AUTO: 13.4 FL (ref 7.5–11.1)
POTASSIUM SERPL-SCNC: 5 MMOL/L (ref 3.5–5.1)
PRO-BNP: 1026 PG/ML
PROLACTIN: 37.2 NG/ML
PROTEIN UA: 30 MG/DL
RBC # BLD: 4.43 M/CU MM (ref 4.6–6.2)
RBC URINE: 13 /HPF (ref 0–3)
SEGMENTED NEUTROPHILS ABSOLUTE COUNT: 5.6 K/CU MM
SEGMENTED NEUTROPHILS RELATIVE PERCENT: 69.5 % (ref 36–66)
SODIUM BLD-SCNC: 139 MMOL/L (ref 135–145)
SPECIFIC GRAVITY UA: 1.02 (ref 1–1.03)
TOTAL IMMATURE NEUTOROPHIL: 0.03 K/CU MM
TOTAL NUCLEATED RBC: 0 K/CU MM
TOTAL PROTEIN: 7.1 GM/DL (ref 6.4–8.2)
TRICHOMONAS: ABNORMAL /HPF
TROPONIN T: 0.03 NG/ML
TSH HIGH SENSITIVITY: 1.2 UIU/ML (ref 0.27–4.2)
UROBILINOGEN, URINE: NORMAL MG/DL (ref 0.2–1)
WBC # BLD: 8 K/CU MM (ref 4–10.5)
WBC UA: 35 /HPF (ref 0–2)

## 2019-08-18 PROCEDURE — 86141 C-REACTIVE PROTEIN HS: CPT

## 2019-08-18 PROCEDURE — 84443 ASSAY THYROID STIM HORMONE: CPT

## 2019-08-18 PROCEDURE — 82962 GLUCOSE BLOOD TEST: CPT

## 2019-08-18 PROCEDURE — 80053 COMPREHEN METABOLIC PANEL: CPT

## 2019-08-18 PROCEDURE — 83605 ASSAY OF LACTIC ACID: CPT

## 2019-08-18 PROCEDURE — 93005 ELECTROCARDIOGRAM TRACING: CPT | Performed by: EMERGENCY MEDICINE

## 2019-08-18 PROCEDURE — 70498 CT ANGIOGRAPHY NECK: CPT

## 2019-08-18 PROCEDURE — 70450 CT HEAD/BRAIN W/O DYE: CPT

## 2019-08-18 PROCEDURE — 87086 URINE CULTURE/COLONY COUNT: CPT

## 2019-08-18 PROCEDURE — 36415 COLL VENOUS BLD VENIPUNCTURE: CPT

## 2019-08-18 PROCEDURE — 84146 ASSAY OF PROLACTIN: CPT

## 2019-08-18 PROCEDURE — 87186 SC STD MICRODIL/AGAR DIL: CPT

## 2019-08-18 PROCEDURE — 71045 X-RAY EXAM CHEST 1 VIEW: CPT

## 2019-08-18 PROCEDURE — 83880 ASSAY OF NATRIURETIC PEPTIDE: CPT

## 2019-08-18 PROCEDURE — 81001 URINALYSIS AUTO W/SCOPE: CPT

## 2019-08-18 PROCEDURE — 82140 ASSAY OF AMMONIA: CPT

## 2019-08-18 PROCEDURE — 84484 ASSAY OF TROPONIN QUANT: CPT

## 2019-08-18 PROCEDURE — 99285 EMERGENCY DEPT VISIT HI MDM: CPT

## 2019-08-18 PROCEDURE — 87077 CULTURE AEROBIC IDENTIFY: CPT

## 2019-08-18 PROCEDURE — 2580000003 HC RX 258: Performed by: EMERGENCY MEDICINE

## 2019-08-18 PROCEDURE — 6360000002 HC RX W HCPCS: Performed by: EMERGENCY MEDICINE

## 2019-08-18 PROCEDURE — 85025 COMPLETE CBC W/AUTO DIFF WBC: CPT

## 2019-08-18 PROCEDURE — 6360000004 HC RX CONTRAST MEDICATION: Performed by: EMERGENCY MEDICINE

## 2019-08-18 PROCEDURE — 96374 THER/PROPH/DIAG INJ IV PUSH: CPT

## 2019-08-18 RX ORDER — SODIUM CHLORIDE 0.9 % (FLUSH) 0.9 %
10 SYRINGE (ML) INJECTION PRN
Status: DISCONTINUED | OUTPATIENT
Start: 2019-08-18 | End: 2019-08-18 | Stop reason: HOSPADM

## 2019-08-18 RX ORDER — 0.9 % SODIUM CHLORIDE 0.9 %
50 INTRAVENOUS SOLUTION INTRAVENOUS ONCE
Status: DISCONTINUED | OUTPATIENT
Start: 2019-08-18 | End: 2019-08-18 | Stop reason: HOSPADM

## 2019-08-18 RX ORDER — ACETAMINOPHEN 325 MG/1
650 TABLET ORAL EVERY 6 HOURS PRN
COMMUNITY

## 2019-08-18 RX ORDER — POLYETHYLENE GLYCOL 3350 17 G/17G
17 POWDER, FOR SOLUTION ORAL DAILY
COMMUNITY

## 2019-08-18 RX ORDER — SODIUM CHLORIDE 0.9 % (FLUSH) 0.9 %
10 SYRINGE (ML) INJECTION EVERY 12 HOURS SCHEDULED
Status: DISCONTINUED | OUTPATIENT
Start: 2019-08-18 | End: 2019-08-18 | Stop reason: HOSPADM

## 2019-08-18 RX ORDER — DEXTROSE MONOHYDRATE 25 G/50ML
12.5 INJECTION, SOLUTION INTRAVENOUS
Status: DISCONTINUED | OUTPATIENT
Start: 2019-08-18 | End: 2019-08-18 | Stop reason: HOSPADM

## 2019-08-18 RX ORDER — 0.9 % SODIUM CHLORIDE 0.9 %
1000 INTRAVENOUS SOLUTION INTRAVENOUS ONCE
Status: COMPLETED | OUTPATIENT
Start: 2019-08-18 | End: 2019-08-18

## 2019-08-18 RX ORDER — LORAZEPAM 2 MG/ML
1 INJECTION INTRAMUSCULAR ONCE
Status: DISCONTINUED | OUTPATIENT
Start: 2019-08-18 | End: 2019-08-18

## 2019-08-18 RX ADMIN — ALTEPLASE 5.8 MG: KIT at 19:27

## 2019-08-18 RX ADMIN — IOPAMIDOL 75 ML: 755 INJECTION, SOLUTION INTRAVENOUS at 18:29

## 2019-08-18 RX ADMIN — SODIUM CHLORIDE 1000 ML: 900 INJECTION INTRAVENOUS at 19:06

## 2019-08-18 RX ADMIN — ALTEPLASE 52.2 MG: KIT at 19:28

## 2019-08-18 NOTE — ED PROVIDER NOTES
Intravenous Once Vimal Martines MD        0.9 % sodium chloride bolus  1,000 mL Intravenous Once Vimal Martines MD        sodium chloride flush 0.9 % injection 10 mL  10 mL Intravenous 2 times per day Vimal Martines MD        sodium chloride flush 0.9 % injection 10 mL  10 mL Intravenous PRN Vimal Martines MD        dextrose 50 % IV solution  12.5 g Intravenous Once PRN Vimal Martines MD        alteplase (ACTIVASE) injection 5.8 mg  0.09 mg/kg Intravenous Once Vimal Martines MD        Followed by   [facility] alteplase (ACTIVASE) injection 52.2 mg  0.81 mg/kg Intravenous Once Vimal Martines MD        Followed by   [facility] 0.9 % sodium chloride bolus  50 mL Intravenous Once Vimal Martines MD         Current Outpatient Medications   Medication Sig Dispense Refill    Metoprolol Succinate 50 MG CS24 50 mg by Per G Tube route daily      polyethylene glycol (GLYCOLAX) packet 17 g by Per G Tube route daily      fluticasone-salmeterol (ADVAIR) 250-50 MCG/DOSE AEPB Inhale 1 puff into the lungs 2 times daily      OXYGEN Inhale 2 L into the lungs continuous      brimonidine (ALPHAGAN) 0.2 % ophthalmic solution Place 1 drop into both eyes 3 times daily       esomeprazole Magnesium (NEXIUM) 40 MG PACK 40 mg by Per G Tube route daily       fluticasone (FLONASE) 50 MCG/ACT nasal spray 1 spray by Each Nare route daily      latanoprost (XALATAN) 0.005 % ophthalmic solution Place 1 drop into both eyes nightly      ipratropium-albuterol (DUONEB) 0.5-2.5 (3) MG/3ML SOLN nebulizer solution Inhale 1 vial into the lungs 4 times daily       melatonin 3 MG TABS tablet Take 3 mg by mouth nightly       montelukast (SINGULAIR) 5 MG chewable tablet Take 10 mg by mouth nightly Per g tube      nitroGLYCERIN (NITRODUR) 0.1 MG/HR Place 1 patch onto the skin daily      pilocarpine (PILOCAR) 1 % ophthalmic solution Place 1 drop into the left eye 3 times daily       timolol (TIMOPTIC-XE) 0.5 % ophthalmic gel-forming Place 1 drop into both eyes 2 times daily     

## 2019-08-18 NOTE — ED NOTES
Pt does not follow commands, does look at you when spoken too, pt does not answer questions. Pt was [facility] at [phone] [facility] and pt was messing with his oxygen tank, next time RN saw pt he was slumped over his wheelchair, pt was not responding at that time.       Omar Wong RN  08/18/19 [id number]

## 2019-08-19 PROCEDURE — 93010 ELECTROCARDIOGRAM REPORT: CPT | Performed by: INTERNAL MEDICINE

## 2019-08-21 LAB
CULTURE: ABNORMAL
Lab: ABNORMAL
SPECIMEN: ABNORMAL
TOTAL COLONY COUNT: ABNORMAL

## 2019-08-23 LAB
EKG ATRIAL RATE: 51 BPM
EKG DIAGNOSIS: NORMAL
EKG P AXIS: 19 DEGREES
EKG P-R INTERVAL: 184 MS
EKG Q-T INTERVAL: 474 MS
EKG QRS DURATION: 120 MS
EKG QTC CALCULATION (BAZETT): 436 MS
EKG R AXIS: -6 DEGREES
EKG T AXIS: 38 DEGREES
EKG VENTRICULAR RATE: 51 BPM